# Patient Record
Sex: FEMALE | Race: WHITE | Employment: OTHER | URBAN - NONMETROPOLITAN AREA
[De-identification: names, ages, dates, MRNs, and addresses within clinical notes are randomized per-mention and may not be internally consistent; named-entity substitution may affect disease eponyms.]

---

## 2017-08-12 ENCOUNTER — HOSPITAL ENCOUNTER (OUTPATIENT)
Age: 67
Discharge: HOME | End: 2017-08-12
Payer: MEDICARE

## 2017-08-12 ENCOUNTER — HOSPITAL ENCOUNTER (OUTPATIENT)
Dept: INTERVENTIONAL RADIOLOGY/VASCULAR | Age: 67
Discharge: HOME | End: 2017-08-12
Payer: MEDICARE

## 2017-08-12 ENCOUNTER — HOSPITAL ENCOUNTER (EMERGENCY)
Age: 67
Discharge: HOME | End: 2017-08-12
Attending: FAMILY MEDICINE
Payer: MEDICARE

## 2017-08-12 VITALS
RESPIRATION RATE: 16 BRPM | SYSTOLIC BLOOD PRESSURE: 164 MMHG | HEIGHT: 66 IN | BODY MASS INDEX: 31.34 KG/M2 | HEART RATE: 90 BPM | TEMPERATURE: 98.9 F | OXYGEN SATURATION: 96 % | DIASTOLIC BLOOD PRESSURE: 73 MMHG | WEIGHT: 195 LBS

## 2017-08-12 DIAGNOSIS — M79.605 LEFT LEG PAIN: Primary | ICD-10-CM

## 2017-08-12 DIAGNOSIS — M79.662 PAIN AND SWELLING OF LEFT LOWER LEG: ICD-10-CM

## 2017-08-12 DIAGNOSIS — M79.89 PAIN AND SWELLING OF LEFT LOWER LEG: ICD-10-CM

## 2017-08-12 PROCEDURE — 93971 EXTREMITY STUDY: CPT

## 2017-08-12 PROCEDURE — 99283 EMERGENCY DEPT VISIT LOW MDM: CPT

## 2017-08-12 RX ORDER — ASPIRIN 81 MG/1
81 TABLET, CHEWABLE ORAL DAILY
COMMUNITY
End: 2021-08-19 | Stop reason: ALTCHOICE

## 2017-08-12 RX ORDER — ATORVASTATIN CALCIUM 10 MG/1
10 TABLET, FILM COATED ORAL DAILY
COMMUNITY
End: 2019-05-16 | Stop reason: ALTCHOICE

## 2017-08-12 ASSESSMENT — ENCOUNTER SYMPTOMS
EYE PAIN: 0
BACK PAIN: 0
RHINORRHEA: 0
NAUSEA: 0
SORE THROAT: 0
VOMITING: 0
ABDOMINAL PAIN: 0
DIARRHEA: 0
EYE DISCHARGE: 0
WHEEZING: 0
COUGH: 0
SHORTNESS OF BREATH: 0

## 2017-08-12 ASSESSMENT — PAIN DESCRIPTION - LOCATION: LOCATION: LEG

## 2017-08-12 ASSESSMENT — PAIN DESCRIPTION - PAIN TYPE: TYPE: ACUTE PAIN

## 2017-08-12 ASSESSMENT — PAIN SCALES - GENERAL: PAINLEVEL_OUTOF10: 5

## 2017-08-12 ASSESSMENT — PAIN DESCRIPTION - ORIENTATION: ORIENTATION: LEFT;POSTERIOR

## 2017-08-12 ASSESSMENT — PAIN DESCRIPTION - DESCRIPTORS: DESCRIPTORS: BURNING;OTHER (COMMENT)

## 2017-12-14 ENCOUNTER — HOSPITAL ENCOUNTER (OUTPATIENT)
Dept: MAMMOGRAPHY | Age: 67
Discharge: HOME | End: 2017-12-14
Payer: MEDICARE

## 2017-12-14 DIAGNOSIS — Z12.39 SCREENING BREAST EXAMINATION: ICD-10-CM

## 2017-12-14 PROCEDURE — 77063 BREAST TOMOSYNTHESIS BI: CPT

## 2018-08-28 ENCOUNTER — HOSPITAL ENCOUNTER (OUTPATIENT)
Dept: MAMMOGRAPHY | Age: 68
Discharge: HOME | End: 2018-08-28
Payer: MEDICARE

## 2018-08-28 DIAGNOSIS — Z12.39 SCREENING BREAST EXAMINATION: ICD-10-CM

## 2018-08-28 PROCEDURE — 77063 BREAST TOMOSYNTHESIS BI: CPT

## 2019-03-25 ENCOUNTER — TELEPHONE (OUTPATIENT)
Dept: FAMILY MEDICINE CLINIC | Age: 69
End: 2019-03-25

## 2019-05-16 ENCOUNTER — OFFICE VISIT (OUTPATIENT)
Dept: FAMILY MEDICINE CLINIC | Age: 69
End: 2019-05-16
Payer: MEDICARE

## 2019-05-16 VITALS
DIASTOLIC BLOOD PRESSURE: 72 MMHG | RESPIRATION RATE: 16 BRPM | HEIGHT: 66 IN | WEIGHT: 191 LBS | HEART RATE: 72 BPM | BODY MASS INDEX: 30.7 KG/M2 | SYSTOLIC BLOOD PRESSURE: 136 MMHG | OXYGEN SATURATION: 96 %

## 2019-05-16 DIAGNOSIS — Z78.0 ASYMPTOMATIC MENOPAUSAL STATE: ICD-10-CM

## 2019-05-16 DIAGNOSIS — Z72.89 OTHER PROBLEMS RELATED TO LIFESTYLE: ICD-10-CM

## 2019-05-16 DIAGNOSIS — Z00.00 ROUTINE GENERAL MEDICAL EXAMINATION AT A HEALTH CARE FACILITY: Primary | ICD-10-CM

## 2019-05-16 DIAGNOSIS — Z13.6 SCREENING FOR CARDIOVASCULAR CONDITION: ICD-10-CM

## 2019-05-16 DIAGNOSIS — E11.9 TYPE 2 DIABETES MELLITUS WITHOUT COMPLICATION, WITHOUT LONG-TERM CURRENT USE OF INSULIN (HCC): ICD-10-CM

## 2019-05-16 DIAGNOSIS — Z13.220 SCREENING FOR HYPERLIPIDEMIA: ICD-10-CM

## 2019-05-16 DIAGNOSIS — Z11.59 ENCOUNTER FOR HEPATITIS C SCREENING TEST FOR LOW RISK PATIENT: ICD-10-CM

## 2019-05-16 DIAGNOSIS — E03.9 ACQUIRED HYPOTHYROIDISM: ICD-10-CM

## 2019-05-16 PROCEDURE — G0472 HEP C SCREEN HIGH RISK/OTHER: HCPCS | Performed by: FAMILY MEDICINE

## 2019-05-16 PROCEDURE — G0446 INTENS BEHAVE THER CARDIO DX: HCPCS | Performed by: FAMILY MEDICINE

## 2019-05-16 PROCEDURE — G0439 PPPS, SUBSEQ VISIT: HCPCS | Performed by: FAMILY MEDICINE

## 2019-05-16 RX ORDER — LEVOTHYROXINE AND LIOTHYRONINE 57; 13.5 UG/1; UG/1
90 TABLET ORAL DAILY
COMMUNITY
End: 2019-11-13 | Stop reason: SDUPTHER

## 2019-05-16 RX ORDER — METFORMIN HYDROCHLORIDE 500 MG/1
500 TABLET, EXTENDED RELEASE ORAL 2 TIMES DAILY
COMMUNITY
Start: 2019-04-01 | End: 2019-11-13 | Stop reason: SDUPTHER

## 2019-05-16 RX ORDER — LEVOTHYROXINE AND LIOTHYRONINE 38; 9 UG/1; UG/1
60 TABLET ORAL DAILY
COMMUNITY
End: 2019-11-13 | Stop reason: SDUPTHER

## 2019-05-16 RX ORDER — ATORVASTATIN CALCIUM 20 MG/1
TABLET, FILM COATED ORAL
COMMUNITY
Start: 2019-04-01 | End: 2019-11-13 | Stop reason: SDUPTHER

## 2019-05-16 ASSESSMENT — LIFESTYLE VARIABLES
HOW OFTEN DURING THE LAST YEAR HAVE YOU FOUND THAT YOU WERE NOT ABLE TO STOP DRINKING ONCE YOU HAD STARTED: 0
HOW OFTEN DO YOU HAVE SIX OR MORE DRINKS ON ONE OCCASION: 0
HOW OFTEN DURING THE LAST YEAR HAVE YOU HAD A FEELING OF GUILT OR REMORSE AFTER DRINKING: 0
AUDIT-C TOTAL SCORE: 1
HAVE YOU OR SOMEONE ELSE BEEN INJURED AS A RESULT OF YOUR DRINKING: 0
HAS A RELATIVE, FRIEND, DOCTOR, OR ANOTHER HEALTH PROFESSIONAL EXPRESSED CONCERN ABOUT YOUR DRINKING OR SUGGESTED YOU CUT DOWN: 0
HOW OFTEN DO YOU HAVE A DRINK CONTAINING ALCOHOL: 1
AUDIT TOTAL SCORE: 1
HOW MANY STANDARD DRINKS CONTAINING ALCOHOL DO YOU HAVE ON A TYPICAL DAY: 0
HOW OFTEN DURING THE LAST YEAR HAVE YOU BEEN UNABLE TO REMEMBER WHAT HAPPENED THE NIGHT BEFORE BECAUSE YOU HAD BEEN DRINKING: 0
HOW OFTEN DURING THE LAST YEAR HAVE YOU FAILED TO DO WHAT WAS NORMALLY EXPECTED FROM YOU BECAUSE OF DRINKING: 0
HOW OFTEN DURING THE LAST YEAR HAVE YOU NEEDED AN ALCOHOLIC DRINK FIRST THING IN THE MORNING TO GET YOURSELF GOING AFTER A NIGHT OF HEAVY DRINKING: 0

## 2019-05-16 ASSESSMENT — PATIENT HEALTH QUESTIONNAIRE - PHQ9
SUM OF ALL RESPONSES TO PHQ QUESTIONS 1-9: 0
SUM OF ALL RESPONSES TO PHQ QUESTIONS 1-9: 0

## 2019-05-16 NOTE — PROGRESS NOTES
Medicare Annual Wellness Visit  Name: Alli Resendez Date: 2019   MRN: [id number] Sex: Female   Age: 80 y.o. Ethnicity: Non-/Non    : 1950 Race: Damian Palacio is here for Medicare AWV; Other (Patient is due for colonoscopy in 2019.); and Other (Patient has not had a dexa scan recently but did have a heel scan.)    Screenings for behavioral, psychosocial and functional/safety risks, and cognitive dysfunction are all negative except as indicated below. These results, as well as other patient data from the [employer] form, are documented in Flowsheets linked to this Encounter. No Known Allergies    Prior to Visit Medications    Medication Sig Taking? Authorizing Provider   atorvastatin (LIPITOR) 20 MG tablet  Yes Historical Provider, MD   metFORMIN (GLUCOPHAGE-XR) 500 MG extended release tablet Take 500 mg by mouth 2 times daily  Yes Historical Provider, MD   thyroid (NP THYROID) 90 MG tablet Take 90 mg by mouth daily , Tuesday, Thursday and Saturday. Patient take Natures Pure Thyroid-not [place] Thyroid Yes Historical Provider, MD   thyroid (NP THYROID) 60 MG tablet Take 60 mg by mouth daily Monday, Wed and Friday. Natures Pure Thyroid-not [place] Yes Historical Provider, MD   aspirin 81 MG chewable tablet Take 81 mg by mouth daily Yes Historical Provider, MD   clobetasol (TEMOVATE) 0.05 % cream Apply  topically daily. Yes Historical Provider, MD   ibuprofen (ADVIL;MOTRIN) 600 MG tablet Take 600 mg by mouth every 6 hours as needed.    Yes Historical Provider, MD       Past Medical History:   Diagnosis Date    Hypothyroidism     Insulin resistance     Pancreatitis     r/t Gall bladder     Past Surgical History:   Procedure Laterality Date     SECTION      CHOLECYSTECTOMY      HYSTERECTOMY      TONSILLECTOMY         Family History   Problem Relation Age of Onset    Diabetes Mother     Heart Disease Mother     Cancer Mother    Dayana within the past year?: Yes  Body mass index is 30.83 kg/m². Health Habits/Nutrition Interventions:  · Inadequate physical activity:  encouraged to continue exercising even now that she is back in [facility], was exercising regularly in [facility] Maintenance Status  Immunization History   Administered Date(s) Administered    Pneumococcal 13-valent Conjugate ([id number]) 01/01/2016    Pneumococcal Polysaccharide (Weiszooau50) 12/04/2014    Tdap (Boostrix, Adacel) 10/23/2018        Health Maintenance   Topic Date Due    Hepatitis C screen  1950    Diabetic foot exam  09/19/1961    Diabetic retinal exam  09/19/1961    Shingles Vaccine (1 of 2) 09/19/2001    Colon cancer screen colonoscopy  09/19/2001    Diabetic microalbuminuria test  12/11/2013    Lipid screen  12/11/2013    A1C test (Diabetic or Prediabetic)  12/05/2014    DEXA (modify frequency per FRAX score)  09/19/2016    Flu vaccine (Season Ended) 09/01/2019    Pneumococcal 65+ years Vaccine (2 of 2 - PPSV23) 12/04/2019    Breast cancer screen  08/28/2020    DTaP/Tdap/Td vaccine (2 - Td) 10/23/2028     Recommendations for Preventive Services Due: see orders and patient instructions/AVS.  . Recommended screening schedule for the next 5-10 years is provided to the patient in written form: see Patient Instructions/AVS.        Cardiovascular Disease Risk Counseling: Assessed the patient's risk to develop cardiovascular disease and reviewed main risk factors.    Reviewed steps to reduce disease risk including:   · Quitting tobacco use, reducing amount smoked, or not starting the habit  · Making healthy food choices  · Being physically active and gradualy increasing activity levels   · Reduce weight and determine a healthy BMI goal  · Monitor blood pressure and treat if higher than 140/90 mmHg  · Maintain blood total cholesterol levels under 5 mmol/l or 190 mg/dl  · Maintain LDL cholesterol levels under 3.0 mmol/l or 115 mg/dl   · Control blood glucose levels  · Consider taking aspirin (75 mg daily), once blood pressure is controlled   Provided a follow up plan.   Time spent (minutes): 5 min

## 2019-05-16 NOTE — PATIENT INSTRUCTIONS
Advance Directives: Care Instructions  Your Care Instructions  An advance directive is a legal way to state your wishes at the end of your life. It tells your family and your doctor what to do if you can no longer say what you want. There are two main types of advance directives. You can change them any time that your wishes change. · A living will tells your family and your doctor your wishes about life support and other treatment. · A durable power of  for health care lets you name a person to make treatment decisions for you when you can't speak for yourself. This person is called a health care agent. If you do not have an advance directive, decisions about your medical care may be made by a doctor or a  who doesn't know you. It may help to think of an advance directive as a gift to the people who care for you. If you have one, they won't have to make tough decisions by themselves. Follow-up care is a key part of your treatment and safety. Be sure to make and go to all appointments, and call your doctor if you are having problems. It's also a good idea to know your test results and keep a list of the medicines you take. How can you care for yourself at home? · Discuss your wishes with your loved ones and your doctor. This way, there are no surprises. · Many states have a unique form. Or you might use a universal form that has been approved by many states. This kind of form can sometimes be completed and stored online. Your electronic copy will then be available wherever you have a connection to the Internet. In most cases, doctors will respect your wishes even if you have a form from a different state. · You don't need a  to do an advance directive. But you may want to get legal advice. · Think about these questions when you prepare an advance directive:  ? Who do you want to make decisions about your medical care if you are not able to?  Many people choose a family member or close friend. ? Do you know enough about life support methods that might be used? If not, talk to your doctor so you understand. ? What are you most afraid of that might happen? You might be afraid of having pain, losing your independence, or being kept alive by machines. ? Where would you prefer to die? Choices include your home, a hospital, or a nursing home. ? Would you like to have information about hospice care to support you and your family? ? Do you want to donate organs when you die? ? Do you want certain Syn[employer] practices performed before you die? If so, put your wishes in the advance directive. · Read your advance directive every year, and make changes as needed. When should you call for help? Be sure to contact your doctor if you have any questions. Where can you learn more? Go to [email or web address].[email or web address] org and sign in to your [employer] account. Enter R264 in the [employer] box to learn more about \"Advance Directives: Care Instructions. \"     If you do not have an account, please click on the \"Sign Up Now\" link. Current as of: April 18, 2018  Content Version: 12.0  © 6896-2332 Healthwise, Incorporated. Care instructions adapted under license by [facility] ([facility]). If you have questions about a medical condition or this instruction, always ask your healthcare professional. [address] any warranty or liability for your use of this information. Learning About Healthy Weight  What is a healthy weight? A healthy weight is the weight at which you feel good about yourself and have energy for work and play. It's also one that lowers your risk for health problems. What can you do to stay at a healthy weight? It can be hard to stay at a healthy weight, especially when fast food, vending-machine snacks, and processed foods are so easy to find. And with your busy lifestyle, activity may be low on your list of things to do.  But staying at a healthy weight may be easier than you think. Here are some dos and don'ts for staying at a healthy weight:  Do eat healthy foods  The kinds of foods you eat have a big impact on both your weight and your health. Reaching and staying at a healthy weight is not about going on a diet. It's about making healthier food choices every day and changing your diet for good. Healthy eating means eating a variety of foods so that you get all the nutrients you need. Your body needs protein, carbohydrate, and fats for energy. They keep your heart beating, your brain active, and your muscles working. On most days, try to eat from each food group. This means eating a variety of:  · Whole grains, such as whole wheat breads and pastas. · Fruits and vegetables. · Dairy products, such as low-fat milk, yogurt, and cheese. · Lean proteins, such as all types of fish, chicken without the skin, and beans. Don't have too much or too little of one thing. All foods, if eaten in moderation, can be part of healthy eating. Even sweets can be okay. If your favorite foods are high in fat, salt, sugar, or calories, limit how often you eat them. Eat smaller servings, or look for healthy substitutes. Do watch what you eat  Many people eat more than their bodies need. Part of staying at a healthy weight means learning how much food you really need from day to day and not eating more than that. Even with healthy foods, eating too much can make you gain weight. Having a well-balanced diet means that you eat enough, but not too much, and that your food gives you the nutrients you need to stay healthy. So listen to your body. Eat when you're hungry. Stop when you feel satisfied. It's a good idea to have healthy snacks ready for when you get hungry. Keep healthy snacks with you at work, in your car, and at home.  If you have a healthy snack easily available, you'll be less likely to pick a candy bar or bag of chips from a vending machine instead. Some healthy snacks you might want to keep on hand are fruit, low-fat yogurt, string cheese, low-fat microwave popcorn, raisins and other dried fruit, nuts, whole wheat crackers, pretzels, carrots, celery sticks, and broccoli. Do some physical activity  A big part of reaching and staying at a healthy weight is being active. When you're active, you burn calories. This makes it easier to reach and stay at a healthy weight. When you're active on a regular basis, your body burns more calories, even when you're at rest. Being active helps you lose fat and build lean muscle. Try to be active for at least 1 hour every day. This may sound like a lot, but it's okay to be active in smaller blocks of time that add up to 1 hour a day. Any activity that makes your heart beat faster and keeps it there for a while counts. A brisk walk, run, or swim will get your heart beating faster. So will climbing stairs, shooting baskets, or cycling. Even some household chores like vacuuming and mowing the lawn will get your heart rate up. Pick activities that you enjoy--ones that make your heart beat faster, your muscles stronger, and your muscles and joints more flexible. If you find more than one thing you like doing, do them all. You don't have to do the same thing every day. Don't diet  Diets don't work. Diets are temporary. Because you give up so much when you diet, you may be hungry and think about food all the time. And after you stop dieting, you also may overeat to make up for what you missed. Most people who diet end up gaining back the pounds they lost--and more. Remember that healthy bodies come in lots of shapes and sizes. Everyone can get healthier by eating better and being more active. Where can you learn more? Go to https://zach.[employer]. org and sign in to your [employer] account. Enter 407 [id number] in the payever box to learn more about \"Learning About Healthy Weight. \"     If you do not have an account, please click on the \"Sign Up Now\" link. Current as of: June 25, 2018  Content Version: 12.0  © 2268-7973 Healthwise, "[employer]. Care instructions adapted under license by [facility] ([facility]). If you have questions about a medical condition or this instruction, always ask your healthcare professional. [address] any warranty or liability for your use of this information. Personalized Preventive Plan for Alexey Guzman - 5/16/2019  Medicare offers a range of preventive health benefits. Some of the tests and screenings are paid in full while other may be subject to a deductible, co-insurance, and/or copay. Some of these benefits include a comprehensive review of your medical history including lifestyle, illnesses that may run in your family, and various assessments and screenings as appropriate. After reviewing your medical record and screening and assessments performed today your provider may have ordered immunizations, labs, imaging, and/or referrals for you. A list of these orders (if applicable) as well as your Preventive Care list are included within your After Visit Summary for your review. Other Preventive Recommendations:    · A preventive eye exam performed by an eye specialist is recommended every 1-2 years to screen for glaucoma; cataracts, macular degeneration, and other eye disorders. · A preventive dental visit is recommended every 6 months. · Try to get at least 150 minutes of exercise per week or 10,000 steps per day on a pedometer . · Order or download the FREE \"Exercise & Physical Activity: Your Everyday Guide\" from [employer]. Call 7-554.614.4873 or search [employer] online. · You need 9505-4227 mg of calcium and 4548-9420 IU of vitamin D per day.  It is possible to meet your calcium requirement with diet alone, but a vitamin D supplement is usually necessary to meet this goal.  · When exposed to

## 2019-05-18 ENCOUNTER — HOSPITAL ENCOUNTER (OUTPATIENT)
Age: 69
Discharge: HOME | End: 2019-05-18
Payer: MEDICARE

## 2019-05-18 DIAGNOSIS — Z13.220 SCREENING FOR HYPERLIPIDEMIA: ICD-10-CM

## 2019-05-18 DIAGNOSIS — E03.9 ACQUIRED HYPOTHYROIDISM: ICD-10-CM

## 2019-05-18 DIAGNOSIS — Z11.59 ENCOUNTER FOR HEPATITIS C SCREENING TEST FOR LOW RISK PATIENT: ICD-10-CM

## 2019-05-18 DIAGNOSIS — E11.9 TYPE 2 DIABETES MELLITUS WITHOUT COMPLICATION, WITHOUT LONG-TERM CURRENT USE OF INSULIN (HCC): ICD-10-CM

## 2019-05-18 LAB
ALBUMIN SERPL-MCNC: 3.8 G/DL (ref 3.5–5.1)
ALP BLD-CCNC: 69 U/L (ref 38–126)
ALT SERPL-CCNC: 25 U/L (ref 11–66)
ANION GAP SERPL CALCULATED.3IONS-SCNC: 14 MEQ/L (ref 8–16)
AST SERPL-CCNC: 23 U/L (ref 5–40)
AVERAGE GLUCOSE: 105 MG/DL (ref 70–126)
BASOPHILS # BLD: 1.1 %
BASOPHILS ABSOLUTE: 0 THOU/MM3 (ref 0–0.1)
BILIRUB SERPL-MCNC: 0.9 MG/DL (ref 0.3–1.2)
BUN BLDV-MCNC: 12 MG/DL (ref 7–22)
CALCIUM SERPL-MCNC: 9.2 MG/DL (ref 8.5–10.5)
CHLORIDE BLD-SCNC: 107 MEQ/L (ref 98–111)
CHOLESTEROL, FASTING: 105 MG/DL (ref 100–199)
CO2: 24 MEQ/L (ref 23–33)
CREAT SERPL-MCNC: 0.8 MG/DL (ref 0.4–1.2)
EOSINOPHIL # BLD: 3.9 %
EOSINOPHILS ABSOLUTE: 0.2 THOU/MM3 (ref 0–0.4)
ERYTHROCYTE [DISTWIDTH] IN BLOOD BY AUTOMATED COUNT: 13.3 % (ref 11.5–14.5)
ERYTHROCYTE [DISTWIDTH] IN BLOOD BY AUTOMATED COUNT: 43.8 FL (ref 35–45)
GFR SERPL CREATININE-BSD FRML MDRD: 71 ML/MIN/1.73M2
GLUCOSE BLD-MCNC: 109 MG/DL (ref 70–108)
HBA1C MFR BLD: 5.5 % (ref 4.4–6.4)
HCT VFR BLD CALC: 40.2 % (ref 37–47)
HDLC SERPL-MCNC: 41 MG/DL
HEMOGLOBIN: 13.5 GM/DL (ref 12–16)
HEPATITIS C ANTIBODY: NEGATIVE
IMMATURE GRANS (ABS): 0.01 THOU/MM3 (ref 0–0.07)
IMMATURE GRANULOCYTES: 0.2 %
LDL CHOLESTEROL CALCULATED: 47 MG/DL
LYMPHOCYTES # BLD: 33.1 %
LYMPHOCYTES ABSOLUTE: 1.5 THOU/MM3 (ref 1–4.8)
MCH RBC QN AUTO: 30.8 PG (ref 26–33)
MCHC RBC AUTO-ENTMCNC: 33.6 GM/DL (ref 32.2–35.5)
MCV RBC AUTO: 91.8 FL (ref 81–99)
MONOCYTES # BLD: 9.1 %
MONOCYTES ABSOLUTE: 0.4 THOU/MM3 (ref 0.4–1.3)
NUCLEATED RED BLOOD CELLS: 0 /100 WBC
PLATELET # BLD: 187 THOU/MM3 (ref 130–400)
PMV BLD AUTO: 11.4 FL (ref 9.4–12.4)
POTASSIUM SERPL-SCNC: 3.8 MEQ/L (ref 3.5–5.2)
RBC # BLD: 4.38 MILL/MM3 (ref 4.2–5.4)
SEG NEUTROPHILS: 52.6 %
SEGMENTED NEUTROPHILS ABSOLUTE COUNT: 2.3 THOU/MM3 (ref 1.8–7.7)
SODIUM BLD-SCNC: 145 MEQ/L (ref 135–145)
TOTAL PROTEIN: 6.6 G/DL (ref 6.1–8)
TRIGLYCERIDE, FASTING: 85 MG/DL (ref 0–199)
TSH SERPL DL<=0.05 MIU/L-ACNC: 2.07 UIU/ML (ref 0.4–4.2)
WBC # BLD: 4.4 THOU/MM3 (ref 4.8–10.8)

## 2019-05-18 PROCEDURE — 83036 HEMOGLOBIN GLYCOSYLATED A1C: CPT

## 2019-05-18 PROCEDURE — 36415 COLL VENOUS BLD VENIPUNCTURE: CPT

## 2019-05-18 PROCEDURE — 85025 COMPLETE CBC W/AUTO DIFF WBC: CPT

## 2019-05-18 PROCEDURE — 80061 LIPID PANEL: CPT

## 2019-05-18 PROCEDURE — 86803 HEPATITIS C AB TEST: CPT

## 2019-05-18 PROCEDURE — 80053 COMPREHEN METABOLIC PANEL: CPT

## 2019-05-18 PROCEDURE — 84443 ASSAY THYROID STIM HORMONE: CPT

## 2019-06-18 ENCOUNTER — HOSPITAL ENCOUNTER (OUTPATIENT)
Dept: MRI IMAGING | Age: 69
Discharge: HOME | End: 2019-06-18
Payer: MEDICARE

## 2019-06-18 DIAGNOSIS — M54.5 LOW BACK PAIN, UNSPECIFIED BACK PAIN LATERALITY, UNSPECIFIED CHRONICITY, WITH SCIATICA PRESENCE UNSPECIFIED: ICD-10-CM

## 2019-06-18 PROCEDURE — 72148 MRI LUMBAR SPINE W/O DYE: CPT

## 2019-08-29 ENCOUNTER — HOSPITAL ENCOUNTER (OUTPATIENT)
Dept: MAMMOGRAPHY | Age: 69
Discharge: HOME | End: 2019-08-29
Payer: MEDICARE

## 2019-08-29 DIAGNOSIS — Z12.39 SCREENING BREAST EXAMINATION: ICD-10-CM

## 2019-08-29 PROCEDURE — 77063 BREAST TOMOSYNTHESIS BI: CPT

## 2019-09-03 ENCOUNTER — APPOINTMENT (OUTPATIENT)
Dept: WOMENS IMAGING | Age: 69
End: 2019-09-03
Payer: MEDICARE

## 2019-09-03 ENCOUNTER — HOSPITAL ENCOUNTER (OUTPATIENT)
Dept: WOMENS IMAGING | Age: 69
Discharge: HOME | End: 2019-09-03
Payer: MEDICARE

## 2019-09-03 DIAGNOSIS — R92.2 BREAST DENSITY: ICD-10-CM

## 2019-09-03 PROCEDURE — G0279 TOMOSYNTHESIS, MAMMO: HCPCS

## 2019-11-13 RX ORDER — ATORVASTATIN CALCIUM 20 MG/1
20 TABLET, FILM COATED ORAL DAILY
Qty: 90 TABLET | Refills: 3 | Status: SHIPPED | OUTPATIENT
Start: 2019-11-13 | End: 2020-09-08 | Stop reason: SDUPTHER

## 2019-11-13 RX ORDER — IBUPROFEN 600 MG/1
600 TABLET ORAL EVERY 6 HOURS PRN
Qty: 360 TABLET | Refills: 3 | Status: SHIPPED | OUTPATIENT
Start: 2019-11-13 | End: 2020-01-14 | Stop reason: SDUPTHER

## 2019-11-13 RX ORDER — LEVOTHYROXINE AND LIOTHYRONINE 57; 13.5 UG/1; UG/1
90 TABLET ORAL DAILY
Qty: 90 TABLET | Refills: 3 | Status: SHIPPED | OUTPATIENT
Start: 2019-11-13 | End: 2020-09-08 | Stop reason: SDUPTHER

## 2019-11-13 RX ORDER — LEVOTHYROXINE AND LIOTHYRONINE 57; 13.5 UG/1; UG/1
90 TABLET ORAL DAILY
Qty: 90 TABLET | Refills: 1 | Status: SHIPPED | OUTPATIENT
Start: 2019-11-13 | End: 2019-11-13 | Stop reason: SDUPTHER

## 2019-11-13 RX ORDER — METFORMIN HYDROCHLORIDE 500 MG/1
500 TABLET, EXTENDED RELEASE ORAL 2 TIMES DAILY
Qty: 180 TABLET | Refills: 3 | Status: SHIPPED | OUTPATIENT
Start: 2019-11-13 | End: 2020-09-08 | Stop reason: SDUPTHER

## 2019-11-13 RX ORDER — LEVOTHYROXINE AND LIOTHYRONINE 38; 9 UG/1; UG/1
60 TABLET ORAL DAILY
Qty: 90 TABLET | Refills: 1 | Status: SHIPPED | OUTPATIENT
Start: 2019-11-13 | End: 2019-11-13 | Stop reason: SDUPTHER

## 2019-11-13 RX ORDER — LEVOTHYROXINE AND LIOTHYRONINE 38; 9 UG/1; UG/1
60 TABLET ORAL DAILY
Qty: 90 TABLET | Refills: 3 | Status: SHIPPED | OUTPATIENT
Start: 2019-11-13 | End: 2020-09-08 | Stop reason: SDUPTHER

## 2019-11-13 RX ORDER — IBUPROFEN 600 MG/1
600 TABLET ORAL EVERY 6 HOURS PRN
Qty: 120 TABLET | Refills: 3 | Status: SHIPPED | OUTPATIENT
Start: 2019-11-13 | End: 2019-11-13 | Stop reason: SDUPTHER

## 2019-11-13 RX ORDER — METFORMIN HYDROCHLORIDE 500 MG/1
500 TABLET, EXTENDED RELEASE ORAL 2 TIMES DAILY
Qty: 180 TABLET | Refills: 3 | Status: SHIPPED | OUTPATIENT
Start: 2019-11-13 | End: 2019-11-13 | Stop reason: SDUPTHER

## 2019-11-13 RX ORDER — ATORVASTATIN CALCIUM 20 MG/1
20 TABLET, FILM COATED ORAL DAILY
Qty: 90 TABLET | Refills: 3 | Status: SHIPPED | OUTPATIENT
Start: 2019-11-13 | End: 2019-11-13 | Stop reason: SDUPTHER

## 2020-01-14 RX ORDER — IBUPROFEN 600 MG/1
600 TABLET ORAL EVERY 6 HOURS PRN
Qty: 360 TABLET | Refills: 3 | Status: SHIPPED | OUTPATIENT
Start: 2020-01-14 | End: 2020-09-08 | Stop reason: SDUPTHER

## 2020-01-14 NOTE — TELEPHONE ENCOUNTER
Pt called stating that she is in [facility] sent her a script for 30 day instead of 90 day. Pt doesn't have the script for the 90 day supply [place] it in [place].

## 2020-03-23 ENCOUNTER — TELEMEDICINE (OUTPATIENT)
Dept: FAMILY MEDICINE CLINIC | Age: 70
End: 2020-03-23
Payer: MEDICARE

## 2020-03-23 PROCEDURE — 99213 OFFICE O/P EST LOW 20 MIN: CPT | Performed by: FAMILY MEDICINE

## 2020-03-23 RX ORDER — AMOXICILLIN 875 MG/1
875 TABLET, COATED ORAL 2 TIMES DAILY
Qty: 20 TABLET | Refills: 0 | Status: SHIPPED | OUTPATIENT
Start: 2020-03-23 | End: 2020-04-02

## 2020-03-23 ASSESSMENT — ENCOUNTER SYMPTOMS
SORE THROAT: 0
NAUSEA: 0
WHEEZING: 0
SHORTNESS OF BREATH: 0
SINUS PRESSURE: 1
DIARRHEA: 0
EYE DISCHARGE: 0
ABDOMINAL PAIN: 0
COUGH: 0
RHINORRHEA: 0
SINUS PAIN: 1
CONSTIPATION: 0

## 2020-03-23 NOTE — PROGRESS NOTES
Iman Becker MD   metFORMIN (GLUCOPHAGE-XR) 500 MG extended release tablet Take 1 tablet by mouth 2 times daily  Iman Becker MD   atorvastatin (LIPITOR) 20 MG tablet Take 1 tablet by mouth daily  Iman Becker MD   aspirin 81 MG chewable tablet Take 81 mg by mouth daily  Historical Provider, MD   clobetasol (TEMOVATE) 0.05 % cream Apply  topically daily.     Historical Provider, MD       Social History     Tobacco Use    Smoking status: Never Smoker    Smokeless tobacco: Former User   Substance Use Topics    Alcohol use: No    Drug use: Not on file        No Known Allergies,   Past Medical History:   Diagnosis Date    Hypothyroidism     Insulin resistance     Pancreatitis     r/t Gall bladder   ,   Past Surgical History:   Procedure Laterality Date     SECTION      CHOLECYSTECTOMY      HYSTERECTOMY      TONSILLECTOMY     ,   Social History     Tobacco Use    Smoking status: Never Smoker    Smokeless tobacco: Former User   Substance Use Topics    Alcohol use: No    Drug use: Not on file   ,   Family History   Problem Relation Age of Onset    Diabetes Mother     Heart Disease Mother     Cancer Mother     Heart Disease Father     Cancer Father         bone    Heart Disease Paternal Uncle     Heart Disease Paternal Grandmother     Heart Disease Paternal Grandfather    ,   Immunization History   Administered Date(s) Administered    Influenza, High Dose (Fluzone 65 yrs and older) 10/07/2017, 10/23/2018    Influenza, Quadv, IM, PF (6 mo and older Fluzone, Flulaval, Fluarix, and 3 yrs and older Afluria) 10/26/2015, 10/08/2019    Pneumococcal Conjugate 13-valent ([id number]) 2016    Pneumococcal Polysaccharide (Gomcvqkwb17) 2014    Tdap (Boostrix, Adacel) 2008, 10/23/2018    Zoster Recombinant (Shingrix) 2019, 2019   ,   Health Maintenance   Topic Date Due    Diabetic foot exam  1961    Diabetic retinal exam  1961    Hepatitis B vaccine (1 of 3 - Risk 3-dose series) 09/19/1970    Diabetic microalbuminuria test  12/11/2013    DEXA (modify frequency per FRAX score)  09/19/2016    Pneumococcal 65+ years Vaccine (2 of 2 - PPSV23) 12/04/2019    Annual Wellness Visit (AWV)  05/16/2020    A1C test (Diabetic or Prediabetic)  05/18/2020    Lipid screen  05/18/2020    Breast cancer screen  09/03/2021    DTaP/Tdap/Td vaccine (3 - Td) 10/23/2028    Colon cancer screen colonoscopy  09/10/2029    Flu vaccine  Completed    Shingles Vaccine  Completed    Hepatitis C screen  Completed    Hepatitis A vaccine  Aged Out    Hib vaccine  Aged Out    Meningococcal (ACWY) vaccine  Aged Out       PHYSICAL EXAMINATION:  [ INSTRUCTIONS:  \"[x]\" Indicates a positive item  \"[]\" Indicates a negative item  -- DELETE ALL ITEMS NOT EXAMINED]  [x] Alert  [] Oriented to person/place/time    [x] No apparent distress  [] Toxic appearing    [] Face flushed appearing [] Sclera clear  [] Lips are cyanotic      [x] Breathing appears normal  [] Appears tachypneic      [] Rash on visible skin    [x] Cranial Nerves II-XII grossly intact    [] Motor grossly intact in visible upper extremities    [] Motor grossly intact in visible lower extremities    [x] Normal Mood  [] Anxious appearing    [] Depressed appearing  [] Confused appearing      [] Poor short term memory  [] Poor long term memory    [] OTHER:      Due to this being a TeleHealth encounter, evaluation of the following organ systems is limited: Vitals/Constitutional/EENT/Resp/CV/GI//MS/Neuro/Skin/Heme-Lymph-Imm. ASSESSMENT/PLAN:  1. Acute sinusitis, recurrence not specified, unspecified location    - amoxicillin (AMOXIL) 875 MG tablet; Take 1 tablet by mouth 2 times daily for 10 days  Dispense: 20 tablet; Refill: 0      No follow-ups on file. An  electronic signature was used to authenticate this note.     --Kia Schultz MD on 3/23/2020 at 4:54 PM      I spent greater than 15 minutes face-to-face virtual visit with patient reviewing past medical history, reviewing lab work, educating, explaining importance of medication adherence and health maintenance recommendations. Of that 15 minutes I spent 12 minutes on counseling and/or coordination of care. Pursuant to the emergency declaration under the [facility] waiver authority and the [employer] and [employer] Act, this Virtual  Visit was conducted, with patient's consent, to reduce the patient's risk of exposure to COVID-19 and provide continuity of care for an established patient. Services were provided through a video synchronous discussion virtually to substitute for in-person clinic visit.

## 2020-04-01 ENCOUNTER — TELEMEDICINE (OUTPATIENT)
Dept: FAMILY MEDICINE CLINIC | Age: 70
End: 2020-04-01
Payer: MEDICARE

## 2020-04-01 PROCEDURE — 99213 OFFICE O/P EST LOW 20 MIN: CPT | Performed by: FAMILY MEDICINE

## 2020-04-01 ASSESSMENT — ENCOUNTER SYMPTOMS
WHEEZING: 0
ABDOMINAL PAIN: 0
SINUS PAIN: 1
SINUS PRESSURE: 1
SORE THROAT: 0
DIARRHEA: 0
SHORTNESS OF BREATH: 0
NAUSEA: 0
COUGH: 1
RHINORRHEA: 0
CONSTIPATION: 0

## 2020-04-01 NOTE — PROGRESS NOTES
and health maintenance recommendations. Of that 15 minutes I spent 15 minutes on counseling and/or coordination of care. Deborah Thao received counseling on the following healthy behaviors: social distancing    Reviewed prior labs and health maintenance. Discussed use, benefit, and side effects of prescribed medications. Barriers to medication compliance addressed. All patient questions answered. Patient voiced understanding. Pursuant to the emergency declaration under the [facility] waiver authority and the [employer] and [employer] Act, this Virtual  Visit was conducted, with patient's consent, to reduce the patient's risk of exposure to COVID-19 and provide continuity of care for an established patient. Services were provided through a video synchronous discussion virtually to substitute for in-person clinic visit.

## 2020-08-24 NOTE — TELEPHONE ENCOUNTER
Pt called stating she needs a refill sent on her test strips to her [employer] [facility]. Pt stated she has been only getting a box of 100 but she has been testing more due to her BS being elevated at times so the pt wants to have more sent in. Pt uses One Touch Verio test strips.

## 2020-08-28 ENCOUNTER — HOSPITAL ENCOUNTER (OUTPATIENT)
Dept: MAMMOGRAPHY | Age: 70
Discharge: HOME | End: 2020-08-28
Payer: MEDICARE

## 2020-08-28 PROCEDURE — 77063 BREAST TOMOSYNTHESIS BI: CPT

## 2020-09-08 ENCOUNTER — PATIENT MESSAGE (OUTPATIENT)
Dept: FAMILY MEDICINE CLINIC | Age: 70
End: 2020-09-08

## 2020-09-08 NOTE — TELEPHONE ENCOUNTER
From: Mahamed Giron  To: Carolyn Tovar MD  Sent: 9/8/2020 10:17 AM EDT  Subject: Prescription Question    on 8/24/20 I talked to Kelvin Gutierrez at Dr Fiona Reyna office and asked for new script for blood glucose test strips refillable for year to be sent. I explained I have been having some wonky readings and testing more often. It looks like there is a new order in my Chart for TID. However I have not recieved the strips. I called today and they did not have the order. They tried to connect with your office while we were on phone but phone just rang and rang. they called [phone]. Sooooogeovanna. Denisa Duke I need test strips order for one touch verio IQ. refillable for 1 year. It looks like new order was for 3 X day. After i talked to medical supply rep today, i found out that since jan 2020 [facility] has changed name to : [facility]. phone number is the same but fax they gave me is different so maybe that is the issue. [facility] phone #s  phone is [phone]  fax now is [phone]      thanks a bunch for your help. please let me know someone is looking into this. Mulu Lenz  [phone]  [email or web address]

## 2020-09-08 NOTE — TELEPHONE ENCOUNTER
Spoke to the pt stating that if we try to print off and fax the script to [facility] that may work to get the test strips for the pt.      [facility]  Phone: [phone]  Fax: [phone]

## 2020-09-09 ENCOUNTER — HOSPITAL ENCOUNTER (OUTPATIENT)
Age: 70
Discharge: HOME | End: 2020-09-09
Payer: MEDICARE

## 2020-09-09 DIAGNOSIS — E11.65 TYPE 2 DIABETES MELLITUS WITH HYPERGLYCEMIA, WITHOUT LONG-TERM CURRENT USE OF INSULIN (HCC): ICD-10-CM

## 2020-09-09 DIAGNOSIS — E03.9 ACQUIRED HYPOTHYROIDISM: ICD-10-CM

## 2020-09-09 LAB
ALBUMIN SERPL-MCNC: 4 G/DL (ref 3.5–5.1)
ALP BLD-CCNC: 86 U/L (ref 38–126)
ALT SERPL-CCNC: 34 U/L (ref 11–66)
ANION GAP SERPL CALCULATED.3IONS-SCNC: 11 MEQ/L (ref 8–16)
AST SERPL-CCNC: 28 U/L (ref 5–40)
AVERAGE GLUCOSE: 123 MG/DL (ref 70–126)
BASOPHILS # BLD: 1.6 %
BASOPHILS ABSOLUTE: 0.1 THOU/MM3 (ref 0–0.1)
BILIRUB SERPL-MCNC: 0.9 MG/DL (ref 0.3–1.2)
BUN BLDV-MCNC: 11 MG/DL (ref 7–22)
CALCIUM SERPL-MCNC: 9.5 MG/DL (ref 8.5–10.5)
CHLORIDE BLD-SCNC: 106 MEQ/L (ref 98–111)
CHOLESTEROL, TOTAL: 137 MG/DL (ref 100–199)
CO2: 26 MEQ/L (ref 23–33)
CREAT SERPL-MCNC: 0.8 MG/DL (ref 0.4–1.2)
CREATININE URINE: 213.2 MG/DL
EOSINOPHIL # BLD: 3.4 %
EOSINOPHILS ABSOLUTE: 0.2 THOU/MM3 (ref 0–0.4)
ERYTHROCYTE [DISTWIDTH] IN BLOOD BY AUTOMATED COUNT: 13.7 % (ref 11.5–14.5)
ERYTHROCYTE [DISTWIDTH] IN BLOOD BY AUTOMATED COUNT: 46.9 FL (ref 35–45)
GFR SERPL CREATININE-BSD FRML MDRD: 71 ML/MIN/1.73M2
GLUCOSE BLD-MCNC: 124 MG/DL (ref 70–108)
HBA1C MFR BLD: 6.1 % (ref 4.4–6.4)
HCT VFR BLD CALC: 40.4 % (ref 37–47)
HDLC SERPL-MCNC: 41 MG/DL
HEMOGLOBIN: 12.9 GM/DL (ref 12–16)
IMMATURE GRANS (ABS): 0.01 THOU/MM3 (ref 0–0.07)
IMMATURE GRANULOCYTES: 0.2 %
LDL CHOLESTEROL CALCULATED: 75 MG/DL
LYMPHOCYTES # BLD: 27.4 %
LYMPHOCYTES ABSOLUTE: 1.4 THOU/MM3 (ref 1–4.8)
MCH RBC QN AUTO: 30 PG (ref 26–33)
MCHC RBC AUTO-ENTMCNC: 31.9 GM/DL (ref 32.2–35.5)
MCV RBC AUTO: 94 FL (ref 81–99)
MONOCYTES # BLD: 8.9 %
MONOCYTES ABSOLUTE: 0.5 THOU/MM3 (ref 0.4–1.3)
NUCLEATED RED BLOOD CELLS: 0 /100 WBC
PLATELET # BLD: 229 THOU/MM3 (ref 130–400)
PMV BLD AUTO: 11.2 FL (ref 9.4–12.4)
POTASSIUM SERPL-SCNC: 4.1 MEQ/L (ref 3.5–5.2)
PROT/CREAT RATIO, UR: 0.04
PROTEIN, URINE: 8.8 MG/DL
RBC # BLD: 4.3 MILL/MM3 (ref 4.2–5.4)
SEG NEUTROPHILS: 58.5 %
SEGMENTED NEUTROPHILS ABSOLUTE COUNT: 3 THOU/MM3 (ref 1.8–7.7)
SODIUM BLD-SCNC: 143 MEQ/L (ref 135–145)
T4 FREE: 0.86 NG/DL (ref 0.93–1.76)
TOTAL PROTEIN: 6.6 G/DL (ref 6.1–8)
TRIGL SERPL-MCNC: 104 MG/DL (ref 0–199)
TSH SERPL DL<=0.05 MIU/L-ACNC: 4.71 UIU/ML (ref 0.4–4.2)
WBC # BLD: 5.1 THOU/MM3 (ref 4.8–10.8)

## 2020-09-09 PROCEDURE — 83036 HEMOGLOBIN GLYCOSYLATED A1C: CPT

## 2020-09-09 PROCEDURE — 84156 ASSAY OF PROTEIN URINE: CPT

## 2020-09-09 PROCEDURE — 82570 ASSAY OF URINE CREATININE: CPT

## 2020-09-09 PROCEDURE — 36415 COLL VENOUS BLD VENIPUNCTURE: CPT

## 2020-09-09 PROCEDURE — 84439 ASSAY OF FREE THYROXINE: CPT

## 2020-09-09 PROCEDURE — 84443 ASSAY THYROID STIM HORMONE: CPT

## 2020-09-09 PROCEDURE — 80061 LIPID PANEL: CPT

## 2020-09-09 PROCEDURE — 85025 COMPLETE CBC W/AUTO DIFF WBC: CPT

## 2020-09-09 PROCEDURE — 80053 COMPREHEN METABOLIC PANEL: CPT

## 2020-09-10 ENCOUNTER — PATIENT MESSAGE (OUTPATIENT)
Dept: FAMILY MEDICINE CLINIC | Age: 70
End: 2020-09-10

## 2020-09-10 RX ORDER — LEVOTHYROXINE AND LIOTHYRONINE 57; 13.5 UG/1; UG/1
90 TABLET ORAL DAILY
Qty: 90 TABLET | Refills: 3 | Status: SHIPPED
Start: 2020-09-10 | End: 2020-10-26 | Stop reason: SDUPTHER

## 2020-09-10 RX ORDER — LEVOTHYROXINE AND LIOTHYRONINE 38; 9 UG/1; UG/1
60 TABLET ORAL DAILY
Qty: 90 TABLET | Refills: 3 | Status: SHIPPED
Start: 2020-09-10 | End: 2020-10-26 | Stop reason: SDUPTHER

## 2020-09-10 NOTE — TELEPHONE ENCOUNTER
From: David Grande  To: Sanket Malagon MD  Sent: 9/10/2020 10:07 AM EDT  Subject: Prescription Question    Hi Dr Sara St,   Nurse told me that my TSH was not right and that it meant I was not taking enough thyroid med. I looked up hypothyroid and have been having many of the low effects . Ie  Tiredness. Absolutely need a nap mid day. Concentration issues. Wake up one day and no motivation. Struggling keeping my weight off. Dry skin , feel hair falling on my skin after brushing. Some swelling and joint pain. Using CBD, cherry juice, raisens soaked in gin, Motrin etc. some days are super and some days feel like   [place] mortis has set in. So rather than wait 6 weeks and redo test, wondering if i can bump up my thyroid to 1.5 grain 5 x week (now on 1.5 four times a week)  And 1 gr 2 days a week (now 1 gr. 3 days week) and then recheck. Maybe I will feel better faster. ? Unless there is reason to wait. I have been taking thyroid on empty stomach and wait about hour til I eat. I forget it occasionally before I eat but never don't take it. Thanks, appreciate you and your staff. Always prompt and courteous.    Shara Roe  [phone]

## 2020-10-19 ENCOUNTER — OFFICE VISIT (OUTPATIENT)
Dept: FAMILY MEDICINE CLINIC | Age: 70
End: 2020-10-19
Payer: MEDICARE

## 2020-10-19 VITALS
HEART RATE: 106 BPM | DIASTOLIC BLOOD PRESSURE: 66 MMHG | HEIGHT: 66 IN | BODY MASS INDEX: 31.72 KG/M2 | OXYGEN SATURATION: 97 % | SYSTOLIC BLOOD PRESSURE: 124 MMHG | TEMPERATURE: 97.4 F | RESPIRATION RATE: 16 BRPM | WEIGHT: 197.4 LBS

## 2020-10-19 PROCEDURE — 99214 OFFICE O/P EST MOD 30 MIN: CPT | Performed by: FAMILY MEDICINE

## 2020-10-19 RX ORDER — GABAPENTIN 100 MG/1
100 CAPSULE ORAL 3 TIMES DAILY
Qty: 270 CAPSULE | Refills: 0 | Status: SHIPPED | OUTPATIENT
Start: 2020-10-19 | End: 2021-06-29 | Stop reason: ALTCHOICE

## 2020-10-19 SDOH — ECONOMIC STABILITY: TRANSPORTATION INSECURITY
IN THE PAST 12 MONTHS, HAS THE LACK OF TRANSPORTATION KEPT YOU FROM MEDICAL APPOINTMENTS OR FROM GETTING MEDICATIONS?: NO

## 2020-10-19 SDOH — ECONOMIC STABILITY: INCOME INSECURITY: HOW HARD IS IT FOR YOU TO PAY FOR THE VERY BASICS LIKE FOOD, HOUSING, MEDICAL CARE, AND HEATING?: NOT HARD AT ALL

## 2020-10-19 SDOH — ECONOMIC STABILITY: FOOD INSECURITY: WITHIN THE PAST 12 MONTHS, YOU WORRIED THAT YOUR FOOD WOULD RUN OUT BEFORE YOU GOT MONEY TO BUY MORE.: NEVER TRUE

## 2020-10-19 SDOH — ECONOMIC STABILITY: FOOD INSECURITY: WITHIN THE PAST 12 MONTHS, THE FOOD YOU BOUGHT JUST DIDN'T LAST AND YOU DIDN'T HAVE MONEY TO GET MORE.: NEVER TRUE

## 2020-10-19 SDOH — ECONOMIC STABILITY: TRANSPORTATION INSECURITY
IN THE PAST 12 MONTHS, HAS LACK OF TRANSPORTATION KEPT YOU FROM MEETINGS, WORK, OR FROM GETTING THINGS NEEDED FOR DAILY LIVING?: NO

## 2020-10-19 ASSESSMENT — PATIENT HEALTH QUESTIONNAIRE - PHQ9
SUM OF ALL RESPONSES TO PHQ QUESTIONS 1-9: 0
SUM OF ALL RESPONSES TO PHQ9 QUESTIONS 1 & 2: 0
SUM OF ALL RESPONSES TO PHQ QUESTIONS 1-9: 0
SUM OF ALL RESPONSES TO PHQ QUESTIONS 1-9: 0
2. FEELING DOWN, DEPRESSED OR HOPELESS: 0
1. LITTLE INTEREST OR PLEASURE IN DOING THINGS: 0

## 2020-10-19 ASSESSMENT — ENCOUNTER SYMPTOMS
COUGH: 0
CONSTIPATION: 0
SORE THROAT: 0
WHEEZING: 0
NAUSEA: 0
SHORTNESS OF BREATH: 0
ABDOMINAL PAIN: 0
DIARRHEA: 0
EYE DISCHARGE: 0
BACK PAIN: 1
RHINORRHEA: 0

## 2020-10-19 NOTE — PROGRESS NOTES
[address] [place] [address] DR. Peterson [place] [address]  Dept: [phone]  Dept Fax: [phone]  Loc: [phone]    Vic Doan is a 72 y.o. female who presents today for her medical conditions/complaints as noted below. Chief Complaint   Patient presents with    Back Pain     Tingling in the feet. MRI in June of 2019           HPI:     Patient presents for follow-up of chronic conditions and with complaints of bilateral numbness and tingling of her feet. She is a known diabetic but her sugars have been stable over the past several years. Her last hemoglobin A1c is 6.1. She states that she checks her sugars regularly and the highest she ever sees is about 140. She denies any signs or symptoms of hypoglycemia. Next, she has hypothyroidism and her labs were slightly off at recent draw so she has been taking 90 mcg of thyroid medicine 5 days a week and 60 mcg 2 days a week. She is due for repeat labs on Thursday. She denies any issues with diarrhea or constipation and denies fatigue or dry skin. Next, she has chronic low back pain and she has seen surgery for this who has said it is just degenerative disc disease and she should not have surgery at this time. She also seeing chiropractor who she states helped significantly however the numbness and tingling persist.  She mentions that it is worse on the right but both legs are tingling. Otherwise feeling well without complaints.   Lab Results       Component                Value               Date                       WBC                      5.1                 09/09/2020                 HGB                      12.9                09/09/2020                 HCT                      40.4                09/09/2020                 PLT                      229                 09/09/2020                 CHOL                     137                 09/09/2020                 TRIG 104                 2020                 HDL                      41                  2020                 ALT                      34                  2020                 AST                      28                  2020                 NA                       143                 2020                 K                        4.1                 2020                 CL                       106                 2020                 CREATININE               0.8                 2020                 BUN                      11                  2020                 CO2                      26                  2020                 TSH                      4.710 (H)           2020                 LABA1C                   6.1                 2020                          Past Medical History:   Diagnosis Date    Hypothyroidism     Insulin resistance     Pancreatitis     r/t Gall bladder      Past Surgical History:   Procedure Laterality Date     SECTION      CHOLECYSTECTOMY      HYSTERECTOMY      TONSILLECTOMY         Family History   Problem Relation Age of Onset    Diabetes Mother     Heart Disease Mother     Cancer Mother     Heart Disease Father     Cancer Father         bone    Heart Disease Paternal Uncle     Heart Disease Paternal Grandmother     Heart Disease Paternal Grandfather        Social History     Tobacco Use    Smoking status: Never Smoker    Smokeless tobacco: Former User   Substance Use Topics    Alcohol use: No      Current Outpatient Medications   Medication Sig Dispense Refill    Multiple Vitamins-Minerals (WOMENS MULTIVITAMIN PO) Take by mouth Chewable      NONFORMULARY [employer] triple joint with glucosamine and condrotin.  gabapentin (NEURONTIN) 100 MG capsule Take 1 capsule by mouth 3 times daily for 90 days.  Intended supply: 90 days 270 capsule 0    thyroid (NP THYROID) 90 MG tablet Take 1 tablet by mouth daily Sunday, Tuesday, Wed, Thursday and Saturday. Patient take Natures Pure Thyroid-not [place] Thyroid 90 tablet 3    thyroid (NP THYROID) 60 MG tablet Take 1 tablet by mouth daily Monday and Friday. Natures Pure Thyroid-not [place] 90 tablet 3    metFORMIN (GLUCOPHAGE-XR) 500 MG extended release tablet Take 1 tablet by mouth 2 times daily 180 tablet 3    atorvastatin (LIPITOR) 20 MG tablet Take 1 tablet by mouth daily 90 tablet 3    ibuprofen (ADVIL;MOTRIN) 600 MG tablet Take 1 tablet by mouth every 6 hours as needed for Pain 360 tablet 3    blood glucose test strips (ASCENSIA AUTODISC VI;ONE TOUCH ULTRA TEST VI) strip Test TID. One Touch Verio test strips Dx code E11.9 300 each 3    aspirin 81 MG chewable tablet Take 81 mg by mouth daily      clobetasol (TEMOVATE) 0.05 % cream Apply  topically daily. No current facility-administered medications for this visit. No Known Allergies    Health Maintenance   Topic Date Due    Diabetic foot exam  09[birth year]19/1960    DEXA (modify frequency per FRAX score)  09/19/2006    Annual Wellness Visit (AWV)  05/29/2019    Pneumococcal 65+ years Vaccine (2 of 2 - PPSV23) 12/04/2019    Diabetic microalbuminuria test  10/19/2021 (Originally 12/11/2013)    A1C test (Diabetic or Prediabetic)  09/09/2021    Lipid screen  09/09/2021    Diabetic retinal exam  10/06/2021    Breast cancer screen  08/28/2022    DTaP/Tdap/Td vaccine (3 - Td) 10/23/2028    Colon cancer screen colonoscopy  09/10/2029    Flu vaccine  Completed    Shingles Vaccine  Completed    Hepatitis C screen  Completed    Hepatitis A vaccine  Aged Out    Hib vaccine  Aged Out    Meningococcal (ACWY) vaccine  Aged Out       Subjective:      Review of Systems   Constitutional: Negative for chills, fatigue and fever. HENT: Positive for congestion and postnasal drip. Negative for rhinorrhea and sore throat. Eyes: Negative for discharge and visual disturbance. Respiratory: Negative for cough, shortness of breath and wheezing. Cardiovascular: Negative for chest pain and palpitations. Gastrointestinal: Negative for abdominal pain, constipation, diarrhea and nausea. Genitourinary: Negative for dysuria and hematuria. Musculoskeletal: Positive for back pain. Negative for arthralgias and myalgias. Neurological: Positive for numbness (feet). Negative for dizziness and headaches. Psychiatric/Behavioral: Negative for sleep disturbance. The patient is not nervous/anxious. Objective:     Physical Exam  Vitals signs and nursing note reviewed. Constitutional:       General: She is not in acute distress. Appearance: She is well-developed. HENT:      Head: Normocephalic and atraumatic. Right Ear: Hearing, tympanic membrane, ear canal and external ear normal.      Left Ear: Hearing, tympanic membrane, ear canal and external ear normal.      Nose:      Right Sinus: No maxillary sinus tenderness or frontal sinus tenderness. Left Sinus: No maxillary sinus tenderness or frontal sinus tenderness. Eyes:      General: No scleral icterus. Right eye: No discharge. Left eye: No discharge. Conjunctiva/sclera: Conjunctivae normal.      Pupils: Pupils are equal, round, and reactive to light. Neck:      Musculoskeletal: Normal range of motion and neck supple. Cardiovascular:      Rate and Rhythm: Normal rate and regular rhythm. Heart sounds: Normal heart sounds. Pulmonary:      Effort: Pulmonary effort is normal. No respiratory distress. Breath sounds: Normal breath sounds. No wheezing. Abdominal:      General: Bowel sounds are normal. There is no distension. Palpations: Abdomen is soft. Tenderness: There is no abdominal tenderness. Musculoskeletal: Normal range of motion. General: No tenderness. Lymphadenopathy:      Cervical: No cervical adenopathy. Skin:     General: Skin is warm and dry. Findings: No rash. Neurological:      General: No focal deficit present. Mental Status: She is alert and oriented to person, place, and time. Mental status is at baseline. Motor: No abnormal muscle tone. Coordination: Coordination normal.   Psychiatric:         Behavior: Behavior normal.         Thought Content: Thought content normal.         Judgment: Judgment normal.     Diabetic foot check: Normal strength and range of motion of toes, feet, and ankles bilaterally. No joint deformity. No cyanosis or clubbing. 100% sensation at all 22 test points with the 10 gram filament. Dorsalis pedis pulses intact bilaterally. Capillary refill at the toes was less than 2 seconds. Light touch sensation intact bilaterally. Hair growth present on feet and toes bilaterally. No skin breakdown, erythema, rub spots, blisters, scaling, or ulcers. No calluses or corns. Toenails thin and not ingrown. No evidence of fungal infection. /66 (Site: Left Upper Arm, Position: Sitting, Cuff Size: Medium Adult)   Pulse 106   Temp 97.4 °F (36.3 °C) (Temporal)   Resp 16   Ht 5' 6\" (1.676 m)   Wt 197 lb 6.4 oz (89.5 kg)   SpO2 97%   BMI 31.86 kg/m²     Assessment:       Diagnosis Orders   1. Type 2 diabetes mellitus with hyperglycemia, without long-term current use of insulin (HCC)  [facility] DIABETES FOOT EXAM   2. At high risk for falls     3. Numbness and tingling of both feet  Vitamin B12 & Folate    gabapentin (NEURONTIN) 100 MG capsule   4. DDD (degenerative disc disease), lumbar     5. Hypothyroidism, unspecified type         Plan:   DM- controlled, cont meds  Safety precautions given  Numbness likely from DDD. Will check b12 to be sure. neurontin qhs to help with symptoms  Will rechekc labs to eval thyroid status. Discussed use, benefit, and side effects of prescribed medications. Barriers tomedication compliance addressed. All patient questions answered. Pt voiced understanding.      No follow-ups on file.    Orders Placed This Encounter   Procedures    Vitamin B12 & Folate     Standing Status:   Future     Standing Expiration Date:   10/19/2021     DIABETES FOOT EXAM     Orders Placed This Encounter   Medications    gabapentin (NEURONTIN) 100 MG capsule     Sig: Take 1 capsule by mouth 3 times daily for 90 days. Intended supply: 90 days     Dispense:  270 capsule     Refill:  0        Reccommended tobacco cessation options including pharmacologicmethods, counseled great than 3 minutes during this visit:  Yes  []  No  []     Patient given educational materials - see patient instructions. Discussed use, benefit, and sideeffects of prescribed medications. All patient questions answered. Pt voiced understanding. Reviewed health maintenance. Instructed to continue current medications, diet and exercise. Patient agreed with treatment plan. Follow up as directed.      Electronically signed by Merline Nielsen, MD on 10/19/2020 at 12:53 PM

## 2020-10-19 NOTE — PATIENT INSTRUCTIONS
Patient Education        Preventing Falls: Care Instructions  Your Care Instructions     Getting around your home safely can be a challenge if you have injuries or health problems that make it easy for you to fall. Loose rugs and furniture in walkways are among the dangers for many older people who have problems walking or who have poor eyesight. People who have conditions such as arthritis, osteoporosis, or dementia also have to be careful not to fall. You can make your home safer with a few simple measures. Follow-up care is a key part of your treatment and safety. Be sure to make and go to all appointments, and call your doctor if you are having problems. It's also a good idea to know your test results and keep a list of the medicines you take. How can you care for yourself at home? Taking care of yourself  · You may get dizzy if you do not drink enough water. To prevent dehydration, drink plenty of fluids, enough so that your urine is light yellow or clear like water. Choose water and other caffeine-free clear liquids. If you have kidney, heart, or liver disease and have to limit fluids, talk with your doctor before you increase the amount of fluids you drink. · Exercise regularly to improve your strength, muscle tone, and balance. Walk if you can. Swimming may be a good choice if you cannot walk easily. · Have your vision and hearing checked each year or any time you notice a change. If you have trouble seeing and hearing, you might not be able to avoid objects and could lose your balance. · Know the side effects of the medicines you take. Ask your doctor or pharmacist whether the medicines you take can affect your balance. Sleeping pills or sedatives can affect your balance. · Limit the amount of alcohol you drink. Alcohol can impair your balance and other senses. · Ask your doctor whether calluses or corns on your feet need to be removed.  If you wear loose-fitting shoes because of calluses or corns, you can lose your balance and fall. · Talk to your doctor if you have numbness in your feet. Preventing falls at home  · Remove raised doorway thresholds, throw rugs, and clutter. Repair loose carpet or raised areas in the floor. · Move furniture and electrical cords to keep them out of walking paths. · Use nonskid floor wax, and wipe up spills right away, especially on ceramic tile floors. · If you use a walker or cane, put rubber tips on it. If you use crutches, clean the bottoms of them regularly with an abrasive pad, such as steel wool. · Keep your house well lit, especially [address], and outside walkways. Use night-lights in areas such as hallways and bathrooms. Add extra light switches or use remote switches (such as switches that go on or off when you clap your hands) to make it easier to turn lights on if you have to get up during the night. · Install sturdy handrails on stairways. · Move items in your cabinets so that the things you use a lot are on the lower shelves (about waist level). · Keep a cordless phone and a flashlight with new batteries by your bed. If possible, put a phone in each of the main rooms of your house, or carry a cell phone in case you fall and cannot reach a phone. Or, you can wear a device around your neck or wrist. You push a button that sends a signal for help. · Wear low-heeled shoes that fit well and give your feet good support. Use footwear with nonskid soles. Check the heels and soles of your shoes for wear. Repair or replace worn heels or soles. · Do not wear socks without shoes on wood floors. · Walk on the grass when the sidewalks are slippery. If you live in an area that gets snow and ice in the winter, sprinkle salt on slippery steps and sidewalks. Preventing falls in the bath  · Install grab bars and nonskid mats inside and outside your shower or tub and near the toilet and sinks. · Use shower chairs and bath benches.   · Use a hand-held shower head that will allow you to sit while showering. · Get into a tub or shower by putting the weaker leg in first. Get out of a tub or shower with your strong side first.  · Repair loose toilet seats and consider installing a raised toilet seat to make getting on and off the toilet easier. · Keep your bathroom door unlocked while you are in the shower. Where can you learn more? Go to https://[email or web address]. org and sign in to your [employer] account. Enter [phone] in the [employer] box to learn more about \"Preventing Falls: Care Instructions. \"     If you do not have an account, please click on the \"Sign Up Now\" link. Current as of: April 15, 2020               Content Version: 12.6  © 0324-8929 [employer]. Care instructions adapted under license by [facility]. If you have questions about a medical condition or this instruction, always ask your healthcare professional. [address] any warranty or liability for your use of this information.

## 2020-10-23 ENCOUNTER — PATIENT MESSAGE (OUTPATIENT)
Dept: FAMILY MEDICINE CLINIC | Age: 70
End: 2020-10-23

## 2020-10-23 LAB
FOLATE: >25 NG/ML
TSH SERPL DL<=0.05 MIU/L-ACNC: 1.65 UIU/ML (ref 0.49–4.67)
VITAMIN B-12: 327 PG/ML (ref 180–914)

## 2020-10-26 RX ORDER — LEVOTHYROXINE AND LIOTHYRONINE 38; 9 UG/1; UG/1
60 TABLET ORAL DAILY
Qty: 24 TABLET | Refills: 3 | Status: SHIPPED | OUTPATIENT
Start: 2020-10-26 | End: 2021-06-29 | Stop reason: SDUPTHER

## 2020-10-26 RX ORDER — LEVOTHYROXINE AND LIOTHYRONINE 57; 13.5 UG/1; UG/1
90 TABLET ORAL DAILY
Qty: 66 TABLET | Refills: 3 | Status: SHIPPED | OUTPATIENT
Start: 2020-10-26 | End: 2021-06-29

## 2020-10-26 NOTE — TELEPHONE ENCOUNTER
From: [facility]  To: Pedro Martines MD  Sent: 10/23/2020 10:35 AM EDT  Subject: Prescription Question    just a reminder     Tsh was low 6 weeks ago. upped my doseage to  90 mg 5 days week  60 mg 2 days week. lab done 10/22 looks like this may be approp dosage. if this is correct dosage, will need to have prescription updated  [facility] and get 90 day supply. 1 year rx. NP thyroid 90 mg 66 tab  NP thyroid 60 mg 24 tab    looks like B 12 is high enough?     thanks  stephanie alarcon

## 2020-11-19 ENCOUNTER — OFFICE VISIT (OUTPATIENT)
Dept: FAMILY MEDICINE CLINIC | Age: 70
End: 2020-11-19
Payer: MEDICARE

## 2020-11-19 VITALS
HEART RATE: 96 BPM | TEMPERATURE: 97 F | OXYGEN SATURATION: 96 % | RESPIRATION RATE: 16 BRPM | BODY MASS INDEX: 31.64 KG/M2 | DIASTOLIC BLOOD PRESSURE: 80 MMHG | SYSTOLIC BLOOD PRESSURE: 138 MMHG | WEIGHT: 196 LBS

## 2020-11-19 LAB
BILIRUBIN, POC: NEGATIVE
BLOOD URINE, POC: NEGATIVE
CLARITY, POC: CLEAR
COLOR, POC: YELLOW
GLUCOSE URINE, POC: NEGATIVE
KETONES, POC: NORMAL
LEUKOCYTE EST, POC: NEGATIVE
NITRITE, POC: NEGATIVE
PH, POC: 5.5
PROTEIN, POC: NEGATIVE
SPECIFIC GRAVITY, POC: 1.02
TSH SERPL DL<=0.05 MIU/L-ACNC: 1.12 UIU/ML (ref 0.4–4.2)
UROBILINOGEN, POC: 0.2

## 2020-11-19 PROCEDURE — 99214 OFFICE O/P EST MOD 30 MIN: CPT | Performed by: FAMILY MEDICINE

## 2020-11-19 PROCEDURE — 81003 URINALYSIS AUTO W/O SCOPE: CPT | Performed by: FAMILY MEDICINE

## 2020-11-19 PROCEDURE — 36415 COLL VENOUS BLD VENIPUNCTURE: CPT | Performed by: FAMILY MEDICINE

## 2020-11-19 ASSESSMENT — ENCOUNTER SYMPTOMS
CHEST TIGHTNESS: 0
ABDOMINAL PAIN: 0
COUGH: 0
CONSTIPATION: 0
BACK PAIN: 1
RHINORRHEA: 0
DIARRHEA: 0
NAUSEA: 0
SINUS PRESSURE: 0
WHEEZING: 0
EYE DISCHARGE: 0
SORE THROAT: 0
SHORTNESS OF BREATH: 0

## 2020-11-19 NOTE — PROGRESS NOTES
[address] [place] [address] DR. Peterson [place] [address]  Dept: [phone]  Dept Fax: [phone]  Loc: [phone]    Cecelia Gonzalez is a 72 y.o. female who presents today for her medical conditions/complaints as noted below. Chief Complaint   Patient presents with    Tingling     Patient states that her feet having been tingling     Other     Patient also has a rash/discomfort and tingling in the face.  Urinary Frequency           HPI:     Patient presents with several complaints today. She has been having numbness and tingling in her feet for the last several months. She was given gabapentin for this and takes if very rarely since prescribed. States that one incident she felt that there were fire ants crawling all over her legs and took a pill and it seemed to help significantly. However her chiropractor told her that it is addictive and she doesn't want to take anything with addictive properties. Patient states that she has also been having episodes of heart racing, tremors, and light headedness as if her blood sugar is low. She took her blood sugar during these episodes and it was in the 80s and 90s. Feels that her thyroid still may not be in control. She recently increaed her thryoid meds from 90mcg 4 times per week to 5 times per week. Last labs were normal, but tsh slightly lower. Has also been having episodes of urinary frequency and took a urine sample yesterday that was clear and colorless. She has had this happen once before when her thyroid was not within range. Patient states that she has been on metformin for years and feels that it is causing her to have facial flushing a few hours after she takes her metformin. States that she has started taking sublingual vitamin B12 because her level was low normal. Didn't know if that could be contributing to some of her symptoms.  Does admit to some increased anxiety. Past Medical History:   Diagnosis Date    Hypothyroidism     Insulin resistance     Pancreatitis     r/t Gall bladder      Past Surgical History:   Procedure Laterality Date     SECTION      CHOLECYSTECTOMY      HYSTERECTOMY      TONSILLECTOMY         Family History   Problem Relation Age of Onset    Diabetes Mother     Heart Disease Mother     Cancer Mother     Heart Disease Father     Cancer Father         bone    Heart Disease Paternal Uncle     Heart Disease Paternal Grandmother     Heart Disease Paternal Grandfather        Social History     Tobacco Use    Smoking status: Never Smoker    Smokeless tobacco: Former User   Substance Use Topics    Alcohol use: No      Current Outpatient Medications   Medication Sig Dispense Refill    thyroid (NP THYROID) 90 MG tablet Take 1 tablet by mouth daily , Tuesday, Wednesday, Thursday and Saturday. Patient take Natures Pure Thyroid-not [place] Thyroid 66 tablet 3    thyroid (NP THYROID) 60 MG tablet Take 1 tablet by mouth daily Monday and Friday. Natures Pure Thyroid-not [place] 24 tablet 3    Multiple Vitamins-Minerals (WOMENS MULTIVITAMIN PO) Take by mouth Chewable      NONFORMULARY [employer] triple joint with glucosamine and condrotin.  gabapentin (NEURONTIN) 100 MG capsule Take 1 capsule by mouth 3 times daily for 90 days. Intended supply: 90 days 270 capsule 0    metFORMIN (GLUCOPHAGE-XR) 500 MG extended release tablet Take 1 tablet by mouth 2 times daily 180 tablet 3    atorvastatin (LIPITOR) 20 MG tablet Take 1 tablet by mouth daily 90 tablet 3    ibuprofen (ADVIL;MOTRIN) 600 MG tablet Take 1 tablet by mouth every 6 hours as needed for Pain 360 tablet 3    blood glucose test strips (ASCENSIA AUTODISC VI;ONE TOUCH ULTRA TEST VI) strip Test TID.    One Touch Verio test strips Dx code E11.9 300 each 3    aspirin 81 MG chewable tablet Take 81 mg by mouth daily      clobetasol (TEMOVATE) 0.05 % cream Apply topically daily. No current facility-administered medications for this visit. No Known Allergies    Health Maintenance   Topic Date Due    DEXA (modify frequency per FRAX score)  09/19/2006    Annual Wellness Visit (AWV)  05/29/2019    Pneumococcal 65+ years Vaccine (2 of 2 - PPSV23) 12/04/2019    Diabetic microalbuminuria test  10/19/2021 (Originally 12/11/2013)    A1C test (Diabetic or Prediabetic)  09/09/2021    Lipid screen  09/09/2021    Diabetic retinal exam  10/06/2021    Diabetic foot exam  10/19/2021    TSH testing  10/22/2021    Breast cancer screen  08/28/2022    DTaP/Tdap/Td vaccine (3 - Td) 10/23/2028    Colon cancer screen colonoscopy  09/10/2029    Flu vaccine  Completed    Shingles Vaccine  Completed    Hepatitis C screen  Completed    Hepatitis A vaccine  Aged Out    Hib vaccine  Aged Out    Meningococcal (ACWY) vaccine  Aged Out       Subjective:      Review of Systems   Constitutional: Positive for diaphoresis. Negative for activity change, appetite change, chills, fatigue, fever and unexpected weight change. HENT: Negative for congestion, postnasal drip, rhinorrhea, sinus pressure and sore throat. Eyes: Negative for discharge and visual disturbance. Respiratory: Negative for cough, chest tightness, shortness of breath and wheezing. Cardiovascular: Positive for palpitations. Negative for chest pain and leg swelling. Gastrointestinal: Negative for abdominal pain, constipation, diarrhea and nausea. Endocrine: Negative for cold intolerance and heat intolerance. Genitourinary: Positive for frequency and urgency. Negative for dysuria, hematuria and pelvic pain. Musculoskeletal: Positive for back pain. Negative for arthralgias and myalgias. Skin: Positive for rash (from wearing mask). Neurological: Positive for tremors, light-headedness and numbness. Negative for dizziness and headaches.         Tingling in bilateral lower extremities Psychiatric/Behavioral: Negative for decreased concentration, dysphoric mood and sleep disturbance. The patient is nervous/anxious. Objective:     Physical Exam  Vitals signs and nursing note reviewed. Constitutional:       Appearance: She is well-developed. HENT:      Head: Normocephalic and atraumatic. Eyes:      General: No scleral icterus. Right eye: No discharge. Left eye: No discharge. Conjunctiva/sclera: Conjunctivae normal.   Neck:      Musculoskeletal: Normal range of motion. Cardiovascular:      Rate and Rhythm: Normal rate and regular rhythm. Heart sounds: Normal heart sounds. Pulmonary:      Effort: Pulmonary effort is normal.      Breath sounds: Normal breath sounds. No wheezing. Abdominal:      General: Bowel sounds are normal. There is no distension. Palpations: Abdomen is soft. Tenderness: There is no abdominal tenderness. Musculoskeletal:         General: No tenderness. Lymphadenopathy:      Cervical: No cervical adenopathy. Skin:     General: Skin is warm and dry. Neurological:      Mental Status: She is alert and oriented to person, place, and time. Coordination: Coordination normal.   Psychiatric:         Behavior: Behavior normal.         Thought Content: Thought content normal.         Judgment: Judgment normal.       /80 (Site: Left Upper Arm, Position: Sitting, Cuff Size: Medium Adult)   Pulse 96   Temp 97 °F (36.1 °C) (Temporal)   Resp 16   Wt 196 lb (88.9 kg)   SpO2 96%   BMI 31.64 kg/m²     Assessment:       Diagnosis Orders   1. Hypothyroidism, unspecified type  TSH with Reflex    TSH with Reflex   2. Urinary frequency  POCT Urinalysis No Micro (Auto)       Plan: Will recheck thyroid labs. Consider lowering thyroid if needed. ua wnl. Also pt to try every other day on metformin and monitor sugars. Discussed use, benefit, and side effects of prescribed medications.   Barriers tomedication compliance addressed. All patient questions answered. Pt voiced understanding. No follow-ups on file. Orders Placed This Encounter   Procedures    TSH with Reflex     Standing Status:   Future     Number of Occurrences:   1     Standing Expiration Date:   11/19/2021    POCT Urinalysis No Micro (Auto)     No orders of the defined types were placed in this encounter. Reccommended tobacco cessation options including pharmacologicmethods, counseled great than 3 minutes during this visit:  Yes  []  No  []     Patient given educational materials - see patient instructions. Discussed use, benefit, and sideeffects of prescribed medications. All patient questions answered. Pt voiced understanding. Reviewed health maintenance. Instructed to continue current medications, diet and exercise. Patient agreed with treatment plan. Follow up as directed.      Electronically signed by Aurora Alexandre MD on 11/19/2020 at 10:59 AM

## 2021-04-13 ENCOUNTER — OFFICE VISIT (OUTPATIENT)
Dept: FAMILY MEDICINE CLINIC | Age: 71
End: 2021-04-13
Payer: MEDICARE

## 2021-04-13 VITALS
OXYGEN SATURATION: 97 % | BODY MASS INDEX: 32.64 KG/M2 | WEIGHT: 202.2 LBS | SYSTOLIC BLOOD PRESSURE: 132 MMHG | DIASTOLIC BLOOD PRESSURE: 82 MMHG | RESPIRATION RATE: 16 BRPM | HEART RATE: 86 BPM | TEMPERATURE: 97.2 F

## 2021-04-13 DIAGNOSIS — E11.65 TYPE 2 DIABETES MELLITUS WITH HYPERGLYCEMIA, WITHOUT LONG-TERM CURRENT USE OF INSULIN (HCC): Primary | ICD-10-CM

## 2021-04-13 DIAGNOSIS — E03.9 HYPOTHYROIDISM, UNSPECIFIED TYPE: ICD-10-CM

## 2021-04-13 DIAGNOSIS — R23.2 FLUSHING: ICD-10-CM

## 2021-04-13 LAB — HBA1C MFR BLD: 6.2 %

## 2021-04-13 PROCEDURE — 99214 OFFICE O/P EST MOD 30 MIN: CPT | Performed by: FAMILY MEDICINE

## 2021-04-13 PROCEDURE — 36415 COLL VENOUS BLD VENIPUNCTURE: CPT | Performed by: FAMILY MEDICINE

## 2021-04-13 PROCEDURE — 83036 HEMOGLOBIN GLYCOSYLATED A1C: CPT | Performed by: FAMILY MEDICINE

## 2021-04-13 RX ORDER — METFORMIN HYDROCHLORIDE 500 MG/1
TABLET, EXTENDED RELEASE ORAL
Qty: 60 TABLET | Refills: 2
Start: 2021-04-13 | End: 2021-08-19

## 2021-04-13 ASSESSMENT — PATIENT HEALTH QUESTIONNAIRE - PHQ9
SUM OF ALL RESPONSES TO PHQ9 QUESTIONS 1 & 2: 0
1. LITTLE INTEREST OR PLEASURE IN DOING THINGS: 0
SUM OF ALL RESPONSES TO PHQ QUESTIONS 1-9: 0
2. FEELING DOWN, DEPRESSED OR HOPELESS: 0

## 2021-04-13 ASSESSMENT — ENCOUNTER SYMPTOMS
SHORTNESS OF BREATH: 0
CONSTIPATION: 0
CHEST TIGHTNESS: 0
DIARRHEA: 0

## 2021-04-13 NOTE — PROGRESS NOTES
[address]. Peterson [place] [address]  Dept: [phone]  Loc: Andi Montiel [place] (:  1950) is a 72 y.o. female, here for evaluation of the following chief complaint(s):  Diabetes      ASSESSMENT/PLAN:  1. Type 2 diabetes mellitus with hyperglycemia, without long-term current use of insulin (HCC)  -     POCT glycosylated hemoglobin (Hb A1C)  -     Comprehensive Metabolic Panel; Future  -     CBC Auto Differential; Future  2. Hypothyroidism, unspecified type  -     TSH without Reflex; Future  -     T4, Free; Future  -     Thyroid Antibodies; Future  -     Thyroid Peroxidase Antibody; Future  3. Flushing    Labs obtained. A1C 6.2 with taking just 1 metformin every other day. If labs normal, consider stopping metformin all together. Discussed goals of sugars fasting vs postprandial.  Will titrate thyroid to hypo end of normal.   No follow-ups on file. SUBJECTIVE/OBJECTIVE:  Patient presents for follow-up of diabetes, hypothyroidism and flushing. She states that lately her sugars have been elevated. She mentions that after she ate a ham sandwich her blood sugar went up to 175. It was also up to 174 postprandial on another occasion. She states that she does not like her sugar this high. Patient is educated that postprandial sugars under 180 are normal.  She is no longer having hypoglycemic events but is only taking her Metformin 3 times per week. Her A1c today is 6.2. She does state lately that she has felt somewhat flushed and when this happens she checks her urine and has found that she has ketones occasionally. She states that she drinks a lot of water the ketones go away. She mentions that she thinks she is going into a moderate ketotic state. She does eat some carbs but tries to limit them. She is currently doing intermittent thyroid dosing as well.   She is taking her 90 mcg of thyroid medication on 4 days per the week and 60 mcg on 3 days. She does state that she usually feels better when she is a little higher normal TSH. She denies any chest pain or short notes of breath although she mentions while she was in [place] she did have some shortness of breath so she made an appointment with cardiology. She states this is coming up in the next few weeks. Review of Systems   Constitutional: Positive for fatigue. Respiratory: Negative for chest tightness and shortness of breath. Gastrointestinal: Negative for constipation and diarrhea. Endocrine: Positive for heat intolerance. Neurological: Negative for dizziness. Psychiatric/Behavioral: Negative for dysphoric mood. The patient is not nervous/anxious. Physical Exam  Vitals signs and nursing note reviewed. Constitutional:       Appearance: She is well-developed. HENT:      Head: Normocephalic and atraumatic. Eyes:      General: No scleral icterus. Right eye: No discharge. Left eye: No discharge. Conjunctiva/sclera: Conjunctivae normal.   Cardiovascular:      Rate and Rhythm: Normal rate and regular rhythm. Heart sounds: Normal heart sounds. Pulmonary:      Effort: Pulmonary effort is normal.      Breath sounds: Normal breath sounds. No wheezing. Skin:     General: Skin is warm and dry. Neurological:      Mental Status: She is alert and oriented to person, place, and time. Mental status is at baseline. Psychiatric:         Behavior: Behavior normal.         Thought Content:  Thought content normal.         Judgment: Judgment normal.         Vitals:    04/13/21 1422   BP: 132/82   Pulse: 86   Resp: 16   Temp: 97.2 °F (36.2 °C)   SpO2: 97%        On this date 4/13/2021 I have spent 30 minutes reviewing previous notes, test results and face to face with the patient discussing the diagnosis and importance of compliance with the treatment plan as well as documenting on the day of the visit. An electronic signature was used to authenticate this note.     --Fidelia Menard MD

## 2021-04-14 ENCOUNTER — PATIENT MESSAGE (OUTPATIENT)
Dept: FAMILY MEDICINE CLINIC | Age: 71
End: 2021-04-14

## 2021-04-14 DIAGNOSIS — Z83.79 FAMILY HISTORY OF CELIAC DISEASE: Primary | ICD-10-CM

## 2021-04-14 LAB
ALBUMIN SERPL-MCNC: 4.4 G/DL (ref 3.5–5.1)
ALP BLD-CCNC: 92 U/L (ref 38–126)
ALT SERPL-CCNC: 53 U/L (ref 11–66)
ANION GAP SERPL CALCULATED.3IONS-SCNC: 10 MEQ/L (ref 8–16)
AST SERPL-CCNC: 40 U/L (ref 5–40)
BASOPHILS # BLD: 1 %
BASOPHILS ABSOLUTE: 0.1 THOU/MM3 (ref 0–0.1)
BILIRUB SERPL-MCNC: 0.6 MG/DL (ref 0.3–1.2)
BUN BLDV-MCNC: 14 MG/DL (ref 7–22)
CALCIUM SERPL-MCNC: 9.8 MG/DL (ref 8.5–10.5)
CHLORIDE BLD-SCNC: 106 MEQ/L (ref 98–111)
CO2: 25 MEQ/L (ref 23–33)
CREAT SERPL-MCNC: 0.8 MG/DL (ref 0.4–1.2)
EOSINOPHIL # BLD: 1.7 %
EOSINOPHILS ABSOLUTE: 0.1 THOU/MM3 (ref 0–0.4)
ERYTHROCYTE [DISTWIDTH] IN BLOOD BY AUTOMATED COUNT: 13.9 % (ref 11.5–14.5)
ERYTHROCYTE [DISTWIDTH] IN BLOOD BY AUTOMATED COUNT: 46.5 FL (ref 35–45)
GFR SERPL CREATININE-BSD FRML MDRD: 71 ML/MIN/1.73M2
GLUCOSE BLD-MCNC: 87 MG/DL (ref 70–108)
HCT VFR BLD CALC: 42.8 % (ref 37–47)
HEMOGLOBIN: 13.6 GM/DL (ref 12–16)
IMMATURE GRANS (ABS): 0.02 THOU/MM3 (ref 0–0.07)
IMMATURE GRANULOCYTES: 0.3 %
LYMPHOCYTES # BLD: 21.6 %
LYMPHOCYTES ABSOLUTE: 1.6 THOU/MM3 (ref 1–4.8)
MCH RBC QN AUTO: 28.9 PG (ref 26–33)
MCHC RBC AUTO-ENTMCNC: 31.8 GM/DL (ref 32.2–35.5)
MCV RBC AUTO: 91.1 FL (ref 81–99)
MONOCYTES # BLD: 8 %
MONOCYTES ABSOLUTE: 0.6 THOU/MM3 (ref 0.4–1.3)
NUCLEATED RED BLOOD CELLS: 0 /100 WBC
PLATELET # BLD: 249 THOU/MM3 (ref 130–400)
PMV BLD AUTO: 11.7 FL (ref 9.4–12.4)
POTASSIUM SERPL-SCNC: 4.4 MEQ/L (ref 3.5–5.2)
RBC # BLD: 4.7 MILL/MM3 (ref 4.2–5.4)
SEG NEUTROPHILS: 67.4 %
SEGMENTED NEUTROPHILS ABSOLUTE COUNT: 4.9 THOU/MM3 (ref 1.8–7.7)
SODIUM BLD-SCNC: 141 MEQ/L (ref 135–145)
T4 FREE: 0.99 NG/DL (ref 0.93–1.76)
TOTAL PROTEIN: 7.1 G/DL (ref 6.1–8)
TSH SERPL DL<=0.05 MIU/L-ACNC: 1.29 UIU/ML (ref 0.4–4.2)
WBC # BLD: 7.2 THOU/MM3 (ref 4.8–10.8)

## 2021-04-15 NOTE — TELEPHONE ENCOUNTER
From: Zoe Perea  To: Lars Jara MD  Sent: 4/14/2021 8:24 PM EDT  Subject: Non-Urgent Medical Question    Hi Dr Casey Billingsley,  One of the things I wanted ask when I was in the other day was about lab work. Recently our son and his daughter were diagnosed with Celiac disease. Diagnosed by antibody tests and endoscopy/biopsies. Since genetic and not known what side of family it comes from wondering if I should/ could get lab order for tests needed.    Thanks  Shannan

## 2021-04-16 ENCOUNTER — PATIENT MESSAGE (OUTPATIENT)
Dept: FAMILY MEDICINE CLINIC | Age: 71
End: 2021-04-16

## 2021-04-16 LAB
THYROGLOBULIN AB: < 0.9 IU/ML (ref 0–4)
THYROID PEROXIDASE ANTIBODY: < 0.3 IU/ML (ref 0–9)

## 2021-04-22 ENCOUNTER — OFFICE VISIT (OUTPATIENT)
Dept: CARDIOLOGY CLINIC | Age: 71
End: 2021-04-22
Payer: MEDICARE

## 2021-04-22 VITALS
HEIGHT: 66 IN | BODY MASS INDEX: 32.3 KG/M2 | SYSTOLIC BLOOD PRESSURE: 180 MMHG | HEART RATE: 106 BPM | DIASTOLIC BLOOD PRESSURE: 80 MMHG | WEIGHT: 201 LBS

## 2021-04-22 DIAGNOSIS — E78.01 FAMILIAL HYPERCHOLESTEROLEMIA: ICD-10-CM

## 2021-04-22 DIAGNOSIS — Z82.49 FAMILY HISTORY OF EARLY CAD: ICD-10-CM

## 2021-04-22 DIAGNOSIS — R07.9 CHEST PAIN, UNSPECIFIED TYPE: Primary | ICD-10-CM

## 2021-04-22 PROCEDURE — 99204 OFFICE O/P NEW MOD 45 MIN: CPT | Performed by: NUCLEAR MEDICINE

## 2021-04-22 PROCEDURE — 93000 ELECTROCARDIOGRAM COMPLETE: CPT | Performed by: NUCLEAR MEDICINE

## 2021-04-22 ASSESSMENT — ENCOUNTER SYMPTOMS
NAUSEA: 0
BACK PAIN: 0
ABDOMINAL PAIN: 0
CONSTIPATION: 0
ANAL BLEEDING: 0
CHEST TIGHTNESS: 0
DIARRHEA: 0
BLOOD IN STOOL: 0
COLOR CHANGE: 0
ABDOMINAL DISTENTION: 0
VOMITING: 0
RECTAL PAIN: 0
SHORTNESS OF BREATH: 1

## 2021-04-22 NOTE — PROGRESS NOTES
[facility] [address] [place] [address].  [address] [place] [id number]  Dept: [phone]  Dept Fax: [phone]  Loc: [phone]    Visit Date: 2021    Willa Hensley is a 72 y.o. female who presents todayfor:  Chief Complaint   Patient presents with    New Patient    Diabetes    Hyperlipidemia    Shortness of Breath     Here for the first time  Used to see tank in   Had a stress test and echo then   Were okay   Here for evaluation now  Does have multiple risk factors for CAD  Does have DM for more than 10 years   Does have hyperlipidemia as well  On statins for that   Does have HTN that seems stable but higher today   Higher HR today   Lately had some more dyspnea  More than usual   Progressive in nature  Does have palpitation at times  Some shoulder pain and neck pain   No known CAD before  No smoking  Does have FH of CAD         HPI:  HPI  Past Medical History:   Diagnosis Date    Hypothyroidism     Insulin resistance     Pancreatitis     r/t Gall bladder      Past Surgical History:   Procedure Laterality Date     SECTION      CHOLECYSTECTOMY      HYSTERECTOMY      TONSILLECTOMY       Family History   Problem Relation Age of Onset    Diabetes Mother     Heart Disease Mother     Cancer Mother     Heart Disease Father     Cancer Father         bone    Heart Disease Paternal Uncle     Heart Disease Paternal Grandmother     Heart Disease Paternal Grandfather      Social History     Tobacco Use    Smoking status: Never Smoker    Smokeless tobacco: Former User   Substance Use Topics    Alcohol use: No      Current Outpatient Medications   Medication Sig Dispense Refill    metFORMIN (GLUCOPHAGE-XR) 500 MG extended release tablet Once daily 3 days a week. 60 tablet 2    thyroid (NP THYROID) 90 MG tablet Take 1 tablet by mouth daily , Tuesday, Wednesday, Thursday and Saturday.  Patient take Natures Pure Thyroid-not [place] Thyroid 66 tablet 3    thyroid (NP THYROID) 60 MG tablet Take 1 tablet by mouth daily Monday and Friday. Natures Pure Thyroid-not [place] 24 tablet 3    Multiple Vitamins-Minerals (WOMENS MULTIVITAMIN PO) Take by mouth Chewable      atorvastatin (LIPITOR) 20 MG tablet Take 1 tablet by mouth daily 90 tablet 3    ibuprofen (ADVIL;MOTRIN) 600 MG tablet Take 1 tablet by mouth every 6 hours as needed for Pain 360 tablet 3    blood glucose test strips (ASCENSIA AUTODISC VI;ONE TOUCH ULTRA TEST VI) strip Test TID. One Touch Verio test strips Dx code E11.9 300 each 3    aspirin 81 MG chewable tablet Take 81 mg by mouth daily      clobetasol (TEMOVATE) 0.05 % cream Apply  topically daily.  gabapentin (NEURONTIN) 100 MG capsule Take 1 capsule by mouth 3 times daily for 90 days. Intended supply: 90 days 270 capsule 0     No current facility-administered medications for this visit. No Known Allergies  Health Maintenance   Topic Date Due    DEXA (modify frequency per FRAX score)  Never done    Annual Wellness Visit (AWV)  Never done    Diabetic microalbuminuria test  10/19/2021 (Originally 12/11/2013)    Pneumococcal 65+ years Vaccine (2 of 2 - PPSV23) 04/14/2023 (Originally 12/4/2019)    Lipid screen  09/09/2021    Diabetic foot exam  10/19/2021    A1C test (Diabetic or Prediabetic)  04/13/2022    Diabetic retinal exam  04/13/2022    TSH testing  04/13/2022    Breast cancer screen  08/28/2022    DTaP/Tdap/Td vaccine (3 - Td) 10/23/2028    Colon cancer screen colonoscopy  09/10/2029    Flu vaccine  Completed    Shingles Vaccine  Completed    COVID-19 Vaccine  Completed    Hepatitis C screen  Completed    Hepatitis A vaccine  Aged Out    Hib vaccine  Aged Out    Meningococcal (ACWY) vaccine  Aged Out       Subjective:  Review of Systems   Constitutional: Positive for fatigue. HENT: Negative for ear pain and nosebleeds. Respiratory: Positive for shortness of breath.  Negative for chest tightness. Cardiovascular: Positive for palpitations. Negative for chest pain. Gastrointestinal: Negative for abdominal distention, abdominal pain, anal bleeding, blood in stool, constipation, diarrhea, nausea, rectal pain and vomiting. Endocrine: Negative for polyphagia. Genitourinary: Negative for dysuria and hematuria. Musculoskeletal: Negative for arthralgias, back pain, gait problem, joint swelling, myalgias, neck pain and neck stiffness. Skin: Negative for color change, pallor, rash and wound. Allergic/Immunologic: Negative for food allergies. Neurological: Negative for dizziness and light-headedness. Psychiatric/Behavioral: Negative for confusion, decreased concentration, dysphoric mood, hallucinations and self-injury. The patient is not nervous/anxious and is not hyperactive. Objective:  Physical Exam  HENT:      Head: Normocephalic. Nose: Nose normal.      Mouth/Throat:      Mouth: Mucous membranes are moist.   Eyes:      Pupils: Pupils are equal, round, and reactive to light. Cardiovascular:      Rate and Rhythm: Normal rate and regular rhythm. Heart sounds: Murmur present. Pulmonary:      Effort: No respiratory distress. Breath sounds: No stridor. No wheezing, rhonchi or rales. Chest:      Chest wall: No tenderness. Abdominal:      General: There is no distension. Palpations: There is no mass. Tenderness: There is no abdominal tenderness. There is no right CVA tenderness, left CVA tenderness, guarding or rebound. Hernia: No hernia is present. Musculoskeletal:         General: No swelling, tenderness, deformity or signs of injury. Right lower leg: No edema. Left lower leg: No edema. Skin:     Coloration: Skin is not jaundiced or pale. Findings: No bruising, erythema, lesion or rash. Neurological:      Mental Status: She is alert and oriented to person, place, and time. Cranial Nerves: No cranial nerve deficit. Sensory: No sensory deficit. Motor: No weakness. Coordination: Coordination normal.      Gait: Gait normal.      Deep Tendon Reflexes: Reflexes normal.   Psychiatric:         Mood and Affect: Mood normal.         Thought Content: Thought content normal.       BP (!) 180/80   Pulse 106   Ht 5' 6\" (1.676 m)   Wt 201 lb (91.2 kg)   BMI 32.44 kg/m²     Assessment:      Diagnosis Orders   1. Chest pain, unspecified type  EKG 12 lead   2. Family history of early CAD  EKG 12 lead   3. Familial hypercholesterolemia     as above  Symptoms as above  Higher risk patient for CAD  New and worsening dyspnea  Borderline abnormal  ECG   ECG in office was done today. I reviewed the ECG. No acute findings      Plan:  No follow-ups on file. Discussed  Non invasive cardiac testing will be ordered to further evaluate for any ischemic or structural heart disease as a cause of the patient symptoms. We will proceed with a Stress Cardiolite test and echo soon. Continue risk factor modification and medical management  Thank you for allowing me to participate in the care of your patient. Please don't hesitate to contact me regarding any further issues related to the patient care      Orders Placed:  Orders Placed This Encounter   Procedures    EKG 12 lead     Order Specific Question:   Reason for Exam?     Answer: Other       Medications Prescribed:  No orders of the defined types were placed in this encounter. Discussed use, benefit, and side effects of prescribed medications. All patient questions answered. Pt voicedunderstanding. Instructed to continue current medications, diet and exercise. Continue risk factor modification and medical management. Patient agreed with treatment plan. Follow up as directed.     Electronically signedby Lazaro Barragan MD on 4/22/2021 at 10:52 AM

## 2021-05-10 ENCOUNTER — HOSPITAL ENCOUNTER (OUTPATIENT)
Dept: NON INVASIVE DIAGNOSTICS | Age: 71
Discharge: HOME | End: 2021-05-10
Payer: MEDICARE

## 2021-05-10 DIAGNOSIS — R07.9 CHEST PAIN, UNSPECIFIED TYPE: ICD-10-CM

## 2021-05-10 DIAGNOSIS — Z82.49 FAMILY HISTORY OF EARLY CAD: ICD-10-CM

## 2021-05-10 DIAGNOSIS — E78.01 FAMILIAL HYPERCHOLESTEROLEMIA: ICD-10-CM

## 2021-05-10 LAB
LV EF: 60 %
LVEF MODALITY: NORMAL

## 2021-05-10 PROCEDURE — A9500 TC99M SESTAMIBI: HCPCS | Performed by: NUCLEAR MEDICINE

## 2021-05-10 PROCEDURE — 93018 CV STRESS TEST I&R ONLY: CPT | Performed by: NUCLEAR MEDICINE

## 2021-05-10 PROCEDURE — 93306 TTE W/DOPPLER COMPLETE: CPT

## 2021-05-10 PROCEDURE — 3430000000 HC RX DIAGNOSTIC RADIOPHARMACEUTICAL: Performed by: NUCLEAR MEDICINE

## 2021-05-10 PROCEDURE — 78452 HT MUSCLE IMAGE SPECT MULT: CPT

## 2021-05-10 PROCEDURE — 78452 HT MUSCLE IMAGE SPECT MULT: CPT | Performed by: NUCLEAR MEDICINE

## 2021-05-10 PROCEDURE — 6360000002 HC RX W HCPCS

## 2021-05-10 PROCEDURE — 93017 CV STRESS TEST TRACING ONLY: CPT | Performed by: NUCLEAR MEDICINE

## 2021-05-10 PROCEDURE — 93016 CV STRESS TEST SUPVJ ONLY: CPT | Performed by: NUCLEAR MEDICINE

## 2021-05-10 RX ADMIN — Medication 28.6 MILLICURIE: at 10:39

## 2021-05-10 RX ADMIN — Medication 8.2 MILLICURIE: at 09:20

## 2021-05-14 ENCOUNTER — TELEPHONE (OUTPATIENT)
Dept: CARDIOLOGY CLINIC | Age: 71
End: 2021-05-14

## 2021-05-14 NOTE — TELEPHONE ENCOUNTER
Please see My Chart Message:       Billy Forget \"Shannan Garrido\"  Nat Schwab MD 28 minutes ago (2:20 PM)        Hi Dr Katie Pimentel, . .. I cant ignore the fact that when at hospital  my BP went up to 206 PRIOR to my stress test May 10. I wasnt really nervous about the exam, was fasting, got my nuclear med and after a wait was laid on hard table for 10 minutes with arms over head. When doing this I had pain in back and shoulders caused by physical issues, not unbearable but it was there. I was cold and started to shiver. I started feeling my heart pounding and need to deep breathe. I have this occasionally, even in middle of night, when bending or climbing stairs etc. I have been told I have a hiatal hernia and am sure being overweight and diabetic belly doesnt help. I guess I was probably worried at this point. sometimes my heart pounding takes hours to settle down. When I got to stress lab, I was ASTOUNDED that my BP was 206. In my spot checks I have never seen it this high before but BP will usually be higher when I visit the Dr etc.   Any idea why my BP went up so high? Fear factor, pain, fasting, dehydrated, reaction to nuclear med, all of the above? I have been monitoring my BP and pulse at your suggestion. I left a sheet with my numbers. I see elevations with work and exercise but sometimes even mid nite when I wake up. I always put it off to blood sugars etc,   Cardiolyte stress couldnt be done with my BP high. So guess I need to know what BP is ok to do exercise. I have been doing a walk fit DVD routine. It starts out with warm up, build up, then cycles of high intensity/recovery. I have been doing 30min/2 mile workout. I have heel spurs and achilles tendonitis in both heels and degenerative back issues and sciatica. It limits what i can do. I do bike when weather permits. What parameters should I follow when I exercise. Today after working in home my BP was 160.  I waited a bit to start my exercise. I stopped at high intensity part and my BP was 166/79 and pulse 104.  30 min post exercise my BP  was 139/80 and pulse 79.      Soooo need some guidelines and suggestions.   thanks  Brenda Humphrey [birth year]

## 2021-05-14 NOTE — TELEPHONE ENCOUNTER
If resting BP is below 150 systolci she should be good to exercise  Monitor the BP and keep us posted

## 2021-05-25 ENCOUNTER — TELEPHONE (OUTPATIENT)
Dept: CARDIOLOGY CLINIC | Age: 71
End: 2021-05-25

## 2021-05-25 NOTE — TELEPHONE ENCOUNTER
Elizabeth Mahoney \"Shannan Garrido\"  Carmen Powers MD 11 hours ago (7:23 PM)     TESS Mack wrote that I should take my BP during day for a week and send results. . see attachment

## 2021-06-14 ENCOUNTER — HOSPITAL ENCOUNTER (EMERGENCY)
Age: 71
Discharge: HOME | End: 2021-06-14
Attending: EMERGENCY MEDICINE
Payer: MEDICARE

## 2021-06-14 ENCOUNTER — TELEPHONE (OUTPATIENT)
Dept: CARDIOLOGY CLINIC | Age: 71
End: 2021-06-14

## 2021-06-14 ENCOUNTER — APPOINTMENT (OUTPATIENT)
Dept: GENERAL RADIOLOGY | Age: 71
End: 2021-06-14
Payer: MEDICARE

## 2021-06-14 VITALS
DIASTOLIC BLOOD PRESSURE: 68 MMHG | SYSTOLIC BLOOD PRESSURE: 139 MMHG | WEIGHT: 197 LBS | RESPIRATION RATE: 18 BRPM | OXYGEN SATURATION: 96 % | BODY MASS INDEX: 31.66 KG/M2 | TEMPERATURE: 98.6 F | HEIGHT: 66 IN | HEART RATE: 97 BPM

## 2021-06-14 DIAGNOSIS — R00.2 PALPITATIONS: Primary | ICD-10-CM

## 2021-06-14 DIAGNOSIS — R00.2 HEART PALPITATIONS: Primary | ICD-10-CM

## 2021-06-14 LAB
ALBUMIN SERPL-MCNC: 4.2 G/DL (ref 3.5–5.1)
ALP BLD-CCNC: 96 U/L (ref 38–126)
ALT SERPL-CCNC: 37 U/L (ref 11–66)
ANION GAP SERPL CALCULATED.3IONS-SCNC: 9 MEQ/L (ref 8–16)
AST SERPL-CCNC: 32 U/L (ref 5–40)
BACTERIA: ABNORMAL /HPF
BASOPHILS # BLD: 1.1 %
BASOPHILS ABSOLUTE: 0.1 THOU/MM3 (ref 0–0.1)
BILIRUB SERPL-MCNC: 0.5 MG/DL (ref 0.3–1.2)
BILIRUBIN URINE: NEGATIVE
BLOOD, URINE: NEGATIVE
BUN BLDV-MCNC: 9 MG/DL (ref 7–22)
CALCIUM SERPL-MCNC: 9.6 MG/DL (ref 8.5–10.5)
CASTS 2: ABNORMAL /LPF
CASTS UA: ABNORMAL /LPF
CHARACTER, URINE: CLEAR
CHLORIDE BLD-SCNC: 106 MEQ/L (ref 98–111)
CO2: 28 MEQ/L (ref 23–33)
COLOR: YELLOW
CREAT SERPL-MCNC: 0.8 MG/DL (ref 0.4–1.2)
CRYSTALS, UA: ABNORMAL
D-DIMER QUANTITATIVE: 392 NG/ML FEU (ref 0–500)
EKG ATRIAL RATE: 118 BPM
EKG P AXIS: 35 DEGREES
EKG P-R INTERVAL: 162 MS
EKG Q-T INTERVAL: 306 MS
EKG QRS DURATION: 68 MS
EKG QTC CALCULATION (BAZETT): 428 MS
EKG R AXIS: -3 DEGREES
EKG T AXIS: 111 DEGREES
EKG VENTRICULAR RATE: 118 BPM
EOSINOPHIL # BLD: 0.8 %
EOSINOPHILS ABSOLUTE: 0 THOU/MM3 (ref 0–0.4)
EPITHELIAL CELLS, UA: ABNORMAL /HPF
ERYTHROCYTE [DISTWIDTH] IN BLOOD BY AUTOMATED COUNT: 13.2 % (ref 11.5–14.5)
ERYTHROCYTE [DISTWIDTH] IN BLOOD BY AUTOMATED COUNT: 44 FL (ref 35–45)
GFR SERPL CREATININE-BSD FRML MDRD: 71 ML/MIN/1.73M2
GLUCOSE BLD-MCNC: 107 MG/DL (ref 70–108)
GLUCOSE URINE: NEGATIVE MG/DL
HCT VFR BLD CALC: 38.9 % (ref 37–47)
HEMOGLOBIN: 12.6 GM/DL (ref 12–16)
IMMATURE GRANS (ABS): 0.02 THOU/MM3 (ref 0–0.07)
IMMATURE GRANULOCYTES: 0.3 %
KETONES, URINE: NEGATIVE
LACTIC ACID: 0.9 MMOL/L (ref 0.5–2)
LEUKOCYTE ESTERASE, URINE: ABNORMAL
LYMPHOCYTES # BLD: 20.3 %
LYMPHOCYTES ABSOLUTE: 1.3 THOU/MM3 (ref 1–4.8)
MCH RBC QN AUTO: 29.6 PG (ref 26–33)
MCHC RBC AUTO-ENTMCNC: 32.4 GM/DL (ref 32.2–35.5)
MCV RBC AUTO: 91.3 FL (ref 81–99)
MISCELLANEOUS 2: ABNORMAL
MONOCYTES # BLD: 8 %
MONOCYTES ABSOLUTE: 0.5 THOU/MM3 (ref 0.4–1.3)
NITRITE, URINE: NEGATIVE
NUCLEATED RED BLOOD CELLS: 0 /100 WBC
OSMOLALITY CALCULATION: 284.1 MOSMOL/KG (ref 275–300)
PH UA: 6.5 (ref 5–9)
PLATELET # BLD: 250 THOU/MM3 (ref 130–400)
PMV BLD AUTO: 10.7 FL (ref 9.4–12.4)
POTASSIUM REFLEX MAGNESIUM: 3.8 MEQ/L (ref 3.5–5.2)
PRO-BNP: 132.7 PG/ML (ref 0–900)
PROTEIN UA: NEGATIVE
RBC # BLD: 4.26 MILL/MM3 (ref 4.2–5.4)
RBC URINE: ABNORMAL /HPF
RENAL EPITHELIAL, UA: ABNORMAL
SEG NEUTROPHILS: 69.5 %
SEGMENTED NEUTROPHILS ABSOLUTE COUNT: 4.3 THOU/MM3 (ref 1.8–7.7)
SODIUM BLD-SCNC: 143 MEQ/L (ref 135–145)
SPECIFIC GRAVITY, URINE: 1.01 (ref 1–1.03)
TOTAL PROTEIN: 7.1 G/DL (ref 6.1–8)
TROPONIN T: < 0.01 NG/ML
TROPONIN T: < 0.01 NG/ML
TSH SERPL DL<=0.05 MIU/L-ACNC: 0.82 UIU/ML (ref 0.4–4.2)
UROBILINOGEN, URINE: 0.2 EU/DL (ref 0–1)
WBC # BLD: 6.2 THOU/MM3 (ref 4.8–10.8)
WBC UA: ABNORMAL /HPF
YEAST: ABNORMAL

## 2021-06-14 PROCEDURE — 71046 X-RAY EXAM CHEST 2 VIEWS: CPT

## 2021-06-14 PROCEDURE — 99285 EMERGENCY DEPT VISIT HI MDM: CPT

## 2021-06-14 PROCEDURE — 84443 ASSAY THYROID STIM HORMONE: CPT

## 2021-06-14 PROCEDURE — 80053 COMPREHEN METABOLIC PANEL: CPT

## 2021-06-14 PROCEDURE — 2580000003 HC RX 258: Performed by: EMERGENCY MEDICINE

## 2021-06-14 PROCEDURE — 85379 FIBRIN DEGRADATION QUANT: CPT

## 2021-06-14 PROCEDURE — 83605 ASSAY OF LACTIC ACID: CPT

## 2021-06-14 PROCEDURE — 85025 COMPLETE CBC W/AUTO DIFF WBC: CPT

## 2021-06-14 PROCEDURE — 84484 ASSAY OF TROPONIN QUANT: CPT

## 2021-06-14 PROCEDURE — 36415 COLL VENOUS BLD VENIPUNCTURE: CPT

## 2021-06-14 PROCEDURE — 93010 ELECTROCARDIOGRAM REPORT: CPT | Performed by: INTERNAL MEDICINE

## 2021-06-14 PROCEDURE — 83880 ASSAY OF NATRIURETIC PEPTIDE: CPT

## 2021-06-14 PROCEDURE — 93005 ELECTROCARDIOGRAM TRACING: CPT | Performed by: EMERGENCY MEDICINE

## 2021-06-14 PROCEDURE — 81001 URINALYSIS AUTO W/SCOPE: CPT

## 2021-06-14 RX ORDER — 0.9 % SODIUM CHLORIDE 0.9 %
1000 INTRAVENOUS SOLUTION INTRAVENOUS ONCE
Status: COMPLETED | OUTPATIENT
Start: 2021-06-14 | End: 2021-06-14

## 2021-06-14 RX ADMIN — SODIUM CHLORIDE 1000 ML: 9 INJECTION, SOLUTION INTRAVENOUS at 19:07

## 2021-06-14 ASSESSMENT — ENCOUNTER SYMPTOMS
NAUSEA: 0
SHORTNESS OF BREATH: 0
COUGH: 0
SORE THROAT: 0
RHINORRHEA: 0
DIARRHEA: 0
CONSTIPATION: 0
VOMITING: 0
ABDOMINAL PAIN: 0
BLOOD IN STOOL: 0

## 2021-06-14 NOTE — ED NOTES
To ed for Palpitations and racing heart. Denies Pain at this time. EKG obtained.  at this time.      Hitesh Holt RN  06/14/21 2025

## 2021-06-14 NOTE — ED PROVIDER NOTES
Selin Rose 13 COMPLAINT       Chief Complaint   Patient presents with    Chest Pain    Palpitations       Nurses Notes reviewed and I agree except as noted in the HPI. HISTORY OF PRESENT ILLNESS    Karel Litten is a 72 y.o. pleasant female who presents to the emergency department due to palpitations and heart pounding. Patient states that she has been having what she describes as flip-flops or a bumpity heart for years. She states around May 10 she was to have a stress test.  She states she was given the nuclear medicine and felt as though her heart was pounding and her blood pressure was too high for the test to be completed. She states he had to be given a chemical stress test which was negative. She reports that on Saturday she had an episode where she felt as though her heart was pounding. Today she was shopping at [employer] around noon, states she did not feel stressed and has not had caffeine intake, began to feel as though her heart was pounding. She went home and checked and her heart rate was 111 and was irregular on an sher on her phone. Her blood pressure was 137 at that time. She states the pounding continued, her highest heart rate was 120 and she also reports a blood pressure reading of 190 at home. She called Dr. Ashwini England office but did not receive a return phone call so came out to the emergency department. She states things are calming down now. Estimates she started feeling as though her heart was not pounding around 4:30 PM.  She denies chest pain. She reports she has had some shortness of breath with these episodes. She reports that the heart pounding sensation started last fall and her thyroid medications were changed as initially thought her thyroid may be the cause. She has not worn a Holter or event monitor. Denies lower extremity pain or worse swelling currently.   At times she is thought she may have a little bit of swelling. No fever, chest pain, abdominal pain, nausea, vomiting, diaphoresis cough, shortness of breath currently. She denies any past history of DVT or PE. No recent surgery or immobility. She states after her stress test she checked her blood pressure for a week and her blood pressure readings were never as high as they had been at that time even after exercise. She states her episodes of heart pounding seem to occur randomly, prior to today it most recently happened while she was sitting at a graduation party. She denies a history of hypertension, tobacco use, family history of heart disease or personal known history of heart disease. She does report having diabetes and high cholesterol. No other initial complaints or concerns. REVIEW OF SYSTEMS     Review of Systems   Constitutional: Negative for diaphoresis and fever. HENT: Negative for congestion, rhinorrhea and sore throat. Eyes: Negative for visual disturbance. Respiratory: Negative for cough and shortness of breath. Cardiovascular: Positive for palpitations. Negative for chest pain and leg swelling. Gastrointestinal: Negative for abdominal pain, blood in stool, constipation, diarrhea, nausea and vomiting. Endocrine: Negative for polyuria. Genitourinary: Negative for difficulty urinating and dysuria. Musculoskeletal: Negative for joint swelling. Skin: Negative for rash. Neurological: Negative for seizures, syncope, facial asymmetry, speech difficulty, weakness and headaches. Hematological: Negative for adenopathy. Psychiatric/Behavioral: Negative for confusion. All other systems reviewed and are negative. PAST MEDICAL HISTORY    has a past medical history of Hypothyroidism, Insulin resistance, and Pancreatitis. SURGICAL HISTORY      has a past surgical history that includes Cholecystectomy;  section; Hysterectomy; and Tonsillectomy.     CURRENT MEDICATIONS       Discharge Medication List as of 2021 10:22 PM      CONTINUE these medications which have NOT CHANGED    Details   metFORMIN (GLUCOPHAGE-XR) 500 MG extended release tablet Once daily 3 days a week., Disp-60 tablet, R-2Adjust Sig      !! thyroid (NP THYROID) 90 MG tablet Take 1 tablet by mouth daily , Tuesday, Wednesday, Thursday and Saturday. Patient take Natures Pure Thyroid-not [place] Thyroid, Disp-66 tablet,R-3Normal      !! thyroid (NP THYROID) 60 MG tablet Take 1 tablet by mouth daily Monday and Friday. Natures Pure Thyroid-not [place], Disp-24 tablet,R-3Normal      Multiple Vitamins-Minerals (WOMENS MULTIVITAMIN PO) Take by mouth ChewableHistorical Med      gabapentin (NEURONTIN) 100 MG capsule Take 1 capsule by mouth 3 times daily for 90 days. Intended supply: 90 days, Disp-270 capsule,R-0Normal      atorvastatin (LIPITOR) 20 MG tablet Take 1 tablet by mouth daily, Disp-90 tablet, R-3Normal      ibuprofen (ADVIL;MOTRIN) 600 MG tablet Take 1 tablet by mouth every 6 hours as needed for Pain, Disp-360 tablet,R-3Normal      blood glucose test strips (ASCENSIA AUTODISC VI;ONE TOUCH ULTRA TEST VI) strip Disp-300 each,R-3, PrintTest TID. One Touch Verio test strips Dx code E11.9      aspirin 81 MG chewable tablet Take 81 mg by mouth dailyHistorical Med      clobetasol (TEMOVATE) 0.05 % cream Apply  topically daily. , Topical, Historical Med       !! - Potential duplicate medications found. Please discuss with provider. ALLERGIES     has No Known Allergies. FAMILY HISTORY     She indicated that her mother is . She indicated that her father is . She indicated that her paternal grandmother is . She indicated that her paternal grandfather is . She indicated that her paternal uncle is . family history includes Cancer in her father and mother; Diabetes in her mother; Heart Disease in her father, mother, paternal grandfather, paternal grandmother, and paternal uncle.     SOCIAL HISTORY edema.]  SKIN: [Warm, dry. No jaundice, rash, urticaria, or petechiae]  NEUROLOGIC: [Alert and oriented x 3, GCS 15, normal mentation for age. Moves all four extremities. No gross sensory deficit. Cerebellar function grossly normal.]  PSYCHIATRIC: [Normal mood and affect, thought process is clear and linear]     DIFFERENTIAL DIAGNOSIS:   ?dehydration, ?hyperthyroidism, ? PE, ??acs, ?? chf, ?anemia, and others    DIAGNOSTIC RESULTS     EKG: All EKG's are interpreted by the Emergency Department Physician who either signs or Co-signsthis chart in the absence of a cardiologist.  EKG shows sinus tachycardia with premature atrial complexes rate of 118 bpm, GA interval 162 ms, QRS duration 68 ms,  ms, no ST elevation or depression, my interpretation. RADIOLOGY: non-plain film images(s) such as CT,Ultrasound and MRI are read by the radiologist.    XR CHEST (2 VW)   Final Result      No acute intrathoracic process. **This report has been created using voice recognition software. It may contain minor errors which are inherent in voice recognition technology. **      Final report electronically signed by Dr. Joanna Del Valle on 6/14/2021 4:43 PM        [] Visualized and interpreted by me   [x] Radiologist's Wet Read Report Reviewed   [] Discussed withRadiologist.    LABS:   Labs Reviewed   GLOMERULAR FILTRATION RATE, ESTIMATED - Abnormal; Notable for the following components:       Result Value    Est, Glom Filt Rate 71 (*)     All other components within normal limits   URINE WITH REFLEXED MICRO - Abnormal; Notable for the following components:    Leukocyte Esterase, Urine TRACE (*)     All other components within normal limits   COMPREHENSIVE METABOLIC PANEL W/ REFLEX TO MG FOR LOW K   TROPONIN   CBC WITH AUTO DIFFERENTIAL   BRAIN NATRIURETIC PEPTIDE   D-DIMER, QUANTITATIVE   TSH WITH REFLEX   LACTIC ACID, PLASMA   ANION GAP   OSMOLALITY   TROPONIN   TROPONIN   TROPONIN       EMERGENCY DEPARTMENT COURSE: as possible for a visit         DISCHARGE MEDICATIONS:  Discharge Medication List as of 6/14/2021 10:22 PM          (Please note that portions of this note were completed with a voice recognition program.  Efforts were made to edit the dictations but occasionally words are mis-transcribed.)    Provider:  I personally performed the services described in the documentation, reviewed and edited the documentation which was dictated, and it accurately records my words and actions.     Delfino Duke MD 6/14/21 2:14 AM                Delfino Duke MD  06/15/21 0296       Delfino Duke MD  06/15/21 [id number]

## 2021-06-14 NOTE — ED NOTES
ED nurse-to-nurse bedside report    Chief Complaint   Patient presents with    Chest Pain    Palpitations      LOC: alert and orientated to name, place, date  Vital signs   Vitals:    06/14/21 1616 06/14/21 1619 06/14/21 1809 06/14/21 1908   BP:  (!) 184/75 (!) 179/85 (!) 154/77   Pulse: 120  100 94   Resp: 20  18 18   Temp:  98.6 °F (37 °C)     TempSrc:  Oral     SpO2: 97%  97% 98%   Weight: 197 lb (89.4 kg)      Height:   5' 6\" (1.676 m)       Pain:    Pain Interventions: none  Pain Goal:   Oxygen: No    Current needs required    Telemetry: Yes  LDAs:   Peripheral IV 06/14/21 Left Forearm (Active)   Site Assessment Clean;Dry; Intact 06/14/21 1907   Line Status Normal saline locked 06/14/21 1907   Dressing Status Clean;Dry; Intact 06/14/21 1907     Continuous Infusions:   Mobility: Independent  Bust[place]ante Fall Risk Score: Fall Risk 10/19/2020 5/16/2019   2 or more falls in past year?  yes no   Fall with injury in past year? no no     Fall Interventions: none  Report given to: Terese Bustillo RN  06/14/21 1931

## 2021-06-14 NOTE — ED NOTES
Pt resting in bed. Resp regular. Denies all needs. Famfily at side. Call light in reach.       Didier Tolentino RN  06/14/21 5897

## 2021-06-14 NOTE — ED NOTES
Pt to room 1. Assessment completed. Pt denies any pain, SOB or palpitations at this time. Pt states she is feeling great now. Lab at bedside for draw. Family at side. Call light in reach.       Oliverio Peng RN  06/14/21 3212

## 2021-06-15 ENCOUNTER — TELEPHONE (OUTPATIENT)
Dept: FAMILY MEDICINE CLINIC | Age: 71
End: 2021-06-15

## 2021-06-15 NOTE — ED NOTES
Pt resting in bed, watching tv, with spouse at bedside. Respirations even and unlabored. Pt denies palpitations at this time.       Layla Bush RN  06/14/21 2013

## 2021-06-23 ENCOUNTER — HOSPITAL ENCOUNTER (OUTPATIENT)
Dept: NON INVASIVE DIAGNOSTICS | Age: 71
Discharge: HOME | End: 2021-06-23
Payer: MEDICARE

## 2021-06-23 DIAGNOSIS — R00.2 HEART PALPITATIONS: ICD-10-CM

## 2021-06-23 PROCEDURE — 93246 EXT ECG>7D<15D RECORDING: CPT

## 2021-06-29 ENCOUNTER — TELEPHONE (OUTPATIENT)
Dept: FAMILY MEDICINE CLINIC | Age: 71
End: 2021-06-29

## 2021-06-29 ENCOUNTER — OFFICE VISIT (OUTPATIENT)
Dept: FAMILY MEDICINE CLINIC | Age: 71
End: 2021-06-29
Payer: MEDICARE

## 2021-06-29 VITALS
OXYGEN SATURATION: 97 % | SYSTOLIC BLOOD PRESSURE: 138 MMHG | TEMPERATURE: 97 F | DIASTOLIC BLOOD PRESSURE: 86 MMHG | HEART RATE: 98 BPM | BODY MASS INDEX: 32.09 KG/M2 | WEIGHT: 198.8 LBS

## 2021-06-29 DIAGNOSIS — R00.2 PALPITATIONS: ICD-10-CM

## 2021-06-29 DIAGNOSIS — E03.9 HYPOTHYROIDISM, UNSPECIFIED TYPE: ICD-10-CM

## 2021-06-29 DIAGNOSIS — I10 ESSENTIAL HYPERTENSION: Primary | ICD-10-CM

## 2021-06-29 DIAGNOSIS — E03.9 ACQUIRED HYPOTHYROIDISM: ICD-10-CM

## 2021-06-29 PROCEDURE — 99214 OFFICE O/P EST MOD 30 MIN: CPT | Performed by: FAMILY MEDICINE

## 2021-06-29 RX ORDER — PROPRANOLOL HYDROCHLORIDE 60 MG/1
60 TABLET ORAL 3 TIMES DAILY PRN
Qty: 60 TABLET | Refills: 1 | Status: SHIPPED | OUTPATIENT
Start: 2021-06-29 | End: 2021-07-14

## 2021-06-29 RX ORDER — LEVOTHYROXINE AND LIOTHYRONINE 38; 9 UG/1; UG/1
60 TABLET ORAL DAILY
Qty: 90 TABLET | Refills: 1 | Status: SHIPPED | OUTPATIENT
Start: 2021-06-29 | End: 2021-12-07

## 2021-06-29 RX ORDER — PROPRANOLOL HYDROCHLORIDE 60 MG/1
60 TABLET ORAL 3 TIMES DAILY PRN
Qty: 60 TABLET | Refills: 1 | Status: SHIPPED | OUTPATIENT
Start: 2021-06-29 | End: 2021-06-29 | Stop reason: SDUPTHER

## 2021-06-29 ASSESSMENT — ENCOUNTER SYMPTOMS
ABDOMINAL PAIN: 0
SHORTNESS OF BREATH: 1
NAUSEA: 0
RHINORRHEA: 0
COUGH: 0
SORE THROAT: 0
WHEEZING: 0
CONSTIPATION: 0
DIARRHEA: 0

## 2021-06-29 NOTE — TELEPHONE ENCOUNTER
Pt states you changed her thyroid med today. She is asking that you please send in new script for thyroid medication at the new dose. 90 days at a time [facility] .

## 2021-06-29 NOTE — PROGRESS NOTES
[address] [place] [address]  Dept: [phone]  Loc: [address] [facility] (:  1950) is a 72 y.o. female, here for evaluation of the following chief complaint(s):  Hypothyroidism, Palpitations (Patient states that she  feel her heart has been pounding. She has had a stress test and an Echo. She is till having episodes while resting and on exertion. ), Discuss Labs (Labs done 21), Other (Patient is currently wearing a 14 day holter monitor. ), and Other (Patient states that Dr. Sol Coburn had given her tizanadine in the past and she would like to discuss. )      ASSESSMENT/PLAN:  1. Essential hypertension  -     propranolol (INDERAL) 60 MG tablet; Take 1 tablet by mouth 3 times daily as needed (blood pressure >160/90), Disp-60 tablet, R-1Normal  2. Hypothyroidism, unspecified type  3. Palpitations  inderal for prn  Will lower thyroid dose to 60mcg daily and recheck labs in 6 wks  holter monitor and follow up with cardio    Return in about 6 weeks (around 8/10/2021) for follow up. SUBJECTIVE/OBJECTIVE:  Resents to discuss palpitations. She states that lately she has had episodes where her heart is pounding and she feels dizzy and unwell. She states that her blood pressure has become very elevated during these times and at one point systolic was greater than 200. She is being seen by cardiology but so far EKG and echo have not found any significant findings. She is currently wearing a Holter monitor. She states also that sometimes her blood pressure will be normal with a systolic of 326 but oftentimes it is high as well. The fluctuation scare her. Of note, patient's TSH was just checked and it was normal but lower end of normal.  Patient states that she typically feels better when her TSH is slightly higher.   She is currently taking 60 mcg of her thyroid medication 4 days a week and 90

## 2021-07-09 ENCOUNTER — TELEPHONE (OUTPATIENT)
Dept: CARDIOLOGY CLINIC | Age: 71
End: 2021-07-09

## 2021-07-09 NOTE — TELEPHONE ENCOUNTER
Grabiel Ulloa \"Shannan Garrido\"  Earnestine Armas MD 11 minutes ago (7:50 AM)     [facility]  I had another heart pounding episode. Of course my holter monitor was removed on 7/7. Heart pounding lasted about 2 hours. It started while I was listening to an old movie and baking cookies. So no stress or exercise. I had visited Dr Lana Corado on June 29 and she adjusted my thyroid meds and gave me an \"as needed \" prescription for generic Inderol to use if BP went above 160/90 and pulse above 100. The heart episode started out as flutters and progressed to pounding. When checking,  my BP was 170/91 pulse 108. Took Inderol about 15 minutes after I took BP. I took just 30 mg since I have never taken it before. Within 5 minutes of taking pill, BP was coming down so I doubt the pill was even effective by then. Pressure continued to come down . What the Inderol did do is give me huge amounts of gas and diarrhea. It is better but still at 17 hours after taking inderol, I have lots of stomach gas. .      Just sending this to let Dr Katerina Dominguez know what is up and if there is anything else he wants me to do at this point. I see him july 14th.

## 2021-07-12 NOTE — TELEPHONE ENCOUNTER
Discussed with patient and she states at times her AM blood pressure is 112. She has an appt with Dr. Jamil Lundy on 7/14/2021 and added to appt notes. She has questions about Verapamil and wants to wait to appt to discuss. Her SBP has been 112-140.

## 2021-07-13 ENCOUNTER — TELEPHONE (OUTPATIENT)
Dept: CARDIOLOGY CLINIC | Age: 71
End: 2021-07-13

## 2021-07-13 NOTE — TELEPHONE ENCOUNTER
From:Malia Garrido       Sent:7/13/2021  7:21 AM EDT         To:Gela Pearce MD    Subject:Non-Urgent Medical Question    I have appt with Dr Nitza Pulliam tomorrow. July 14. Holter monitor was taken off july 7 . It looks like monitor arrived at [address] [place] [address] In [place] July 9 at 10 am. Could someone make sure the results are back  so I can discuss with Dr Nitza Pulliam tomorrow? Or call them to see if reading is done. Thanks  Adelina Diehl      Spoke with Ronny Corbin in EKG/Holter lab. She will check in office a let us know.

## 2021-07-14 ENCOUNTER — OFFICE VISIT (OUTPATIENT)
Dept: CARDIOLOGY CLINIC | Age: 71
End: 2021-07-14
Payer: MEDICARE

## 2021-07-14 ENCOUNTER — TELEPHONE (OUTPATIENT)
Dept: CARDIOLOGY CLINIC | Age: 71
End: 2021-07-14

## 2021-07-14 VITALS
WEIGHT: 200.2 LBS | DIASTOLIC BLOOD PRESSURE: 90 MMHG | HEART RATE: 104 BPM | HEIGHT: 66 IN | BODY MASS INDEX: 32.17 KG/M2 | SYSTOLIC BLOOD PRESSURE: 152 MMHG

## 2021-07-14 DIAGNOSIS — I10 ESSENTIAL HYPERTENSION: ICD-10-CM

## 2021-07-14 DIAGNOSIS — E78.01 FAMILIAL HYPERCHOLESTEROLEMIA: ICD-10-CM

## 2021-07-14 DIAGNOSIS — R00.2 PALPITATION: Primary | ICD-10-CM

## 2021-07-14 PROCEDURE — 99214 OFFICE O/P EST MOD 30 MIN: CPT | Performed by: NUCLEAR MEDICINE

## 2021-07-14 RX ORDER — METOPROLOL SUCCINATE 25 MG/1
25 TABLET, EXTENDED RELEASE ORAL DAILY
COMMUNITY
End: 2021-07-14 | Stop reason: SDUPTHER

## 2021-07-14 RX ORDER — METOPROLOL SUCCINATE 25 MG/1
25 TABLET, EXTENDED RELEASE ORAL DAILY
Qty: 90 TABLET | Refills: 1 | Status: SHIPPED | OUTPATIENT
Start: 2021-07-14 | End: 2021-10-21 | Stop reason: SDUPTHER

## 2021-07-14 NOTE — PROCEDURES
[address] [place] [address]                                 EVENT MONITOR    PATIENT NAME: Fabio Segal                   :        1950  MED REC NO:   [id number]                           ROOM:  ACCOUNT NO:   [de-identified]                           ADMIT DATE: 2021  PROVIDER:     Dickson Serrano M.D. CLINICAL HISTORY AND INDICATION:  This is a patient with palpitation. EVENT MONITOR DESCRIPTION:  Event monitor was attached to the patient  between 2021 and 2021. EVENT MONITOR FINDINGS:  Baseline rhythm showed sinus rhythm with short  intermittent episodes of narrow complex tachyarrhythmia, likely atrial  fibrillation of several beats. No sustained AFib was noted. No V-tach  and no pauses. CONCLUSION:  1. Sinus rhythm. 2.  Short nonsustained episodes of atrial fibrillation, rapid  ventricular response at several beats at a time were noted on different  occasions. 3.  Clinical correlation is recommended.         Vesna Garcia M.D.    D: 2021 7:28:56       T: 2021 9:39:19     [email or web address]  Job#: [id number]     Doc#: [id number]    CC:

## 2021-07-14 NOTE — PROGRESS NOTES
[address] [facility] .  [address] [place] [id number]  Dept: [phone]  Dept Fax: [phone]  Loc: [phone]    Visit Date: 2021    Gracia Serrano is a 72 y.o. female who presents todayfor:  Chief Complaint   Patient presents with    3 Month Follow-Up    Check-Up    Results    Palpitations    Hypertension   stress test and echo were okay   Multiple risk factors for many years   Dm for a while  Lately dealing with palpitation   More than usual   Associated HTN   Off of meds now   A fib on event monitor   She is HEIDI score 3    HPI:  HPI  Past Medical History:   Diagnosis Date    Hypothyroidism     Insulin resistance     Pancreatitis     r/t Gall bladder      Past Surgical History:   Procedure Laterality Date     SECTION      CHOLECYSTECTOMY      HYSTERECTOMY      TONSILLECTOMY       Family History   Problem Relation Age of Onset    Diabetes Mother     Heart Disease Mother     Cancer Mother     Heart Disease Father     Cancer Father         bone    Heart Disease Paternal Uncle     Heart Disease Paternal Grandmother     Heart Disease Paternal Grandfather      Social History     Tobacco Use    Smoking status: Never Smoker    Smokeless tobacco: Never Used   Substance Use Topics    Alcohol use: No      Current Outpatient Medications   Medication Sig Dispense Refill    thyroid (NP THYROID) 60 MG tablet Take 1 tablet by mouth daily Natures Pure Thyroid-not [place] 90 tablet 1    metFORMIN (GLUCOPHAGE-XR) 500 MG extended release tablet Once daily 3 days a week.  (Patient taking differently: PRN) 60 tablet 2    Multiple Vitamins-Minerals (WOMENS MULTIVITAMIN PO) Take by mouth Chewable      atorvastatin (LIPITOR) 20 MG tablet Take 1 tablet by mouth daily 90 tablet 3    ibuprofen (ADVIL;MOTRIN) 600 MG tablet Take 1 tablet by mouth every 6 hours as needed for Pain 360 tablet 3    blood glucose test strips (ASCENSIA AUTODISC VI;ONE TOUCH ULTRA TEST VI) strip Test TID. One Touch Verio test strips Dx code E11.9 300 each 3    aspirin 81 MG chewable tablet Take 81 mg by mouth daily      clobetasol (TEMOVATE) 0.05 % cream Apply  topically daily. No current facility-administered medications for this visit. No Known Allergies  Health Maintenance   Topic Date Due    DEXA (modify frequency per FRAX score)  Never done    Annual Wellness Visit (AWV)  Never done    Diabetic microalbuminuria test  10/19/2021 (Originally 12/11/2013)    Pneumococcal 65+ years Vaccine (2 of 2 - PPSV23) 04/14/2023 (Originally 12/4/2019)    Flu vaccine (1) 09/01/2021    Lipid screen  09/09/2021    Diabetic foot exam  10/19/2021    A1C test (Diabetic or Prediabetic)  04/13/2022    Diabetic retinal exam  04/13/2022    TSH testing  06/14/2022    Breast cancer screen  08/28/2022    DTaP/Tdap/Td vaccine (3 - Td or Tdap) 10/23/2028    Colon cancer screen colonoscopy  09/10/2029    Shingles Vaccine  Completed    COVID-19 Vaccine  Completed    Hepatitis C screen  Completed    Hepatitis A vaccine  Aged Out    Hib vaccine  Aged Out    Meningococcal (ACWY) vaccine  Aged Out       Subjective:  Review of Systems  General:   No fever, no chills, some fatigue or weight loss  Pulmonary:    some dyspnea, no wheezing  Cardiac:    Denies recent chest pain,   GI:     No nausea or vomiting, no abdominal pain  Neuro:     No dizziness or light headedness,   Musculoskeletal:  No recent active issues  Extremities:   No edema, no obvious claudication       Objective:  Physical Exam  BP (!) 152/90   Pulse 104   Ht 5' 6\" (1.676 m)   Wt 200 lb 3.2 oz (90.8 kg)   BMI 32.31 kg/m²   General:   Well developed, well nourished  Lungs:   Clear to auscultation  Heart:    Normal S1 S2, Slight murmur.  no rubs, no gallops  Abdomen:   Soft, non tender, no organomegalies, positive bowel sounds  Extremities:   No edema, no cyanosis, good peripheral pulses  Neurological:   Awake, alert, oriented. No obvious focal deficits  Musculoskelatal:  No obvious deformities    Assessment:      Diagnosis Orders   1. Palpitation     2. Essential hypertension     3. Familial hypercholesterolemia     new a fib   HEIDI vasc 3  Intermittent in nature  New onset  No ischemia     Plan:  No follow-ups on file. Discussed  Discussed NOACs at length   Discussed beta blockers  Discussed possible flecainide  Continue risk factor modification and medical management  Thank you for allowing me to participate in the care of your patient. Please don't hesitate to contact me regarding any further issues related to the patient care    Orders Placed:  No orders of the defined types were placed in this encounter. Medications Prescribed:  No orders of the defined types were placed in this encounter. Discussed use, benefit, and side effects of prescribed medications. All patient questions answered. Pt voicedunderstanding. Instructed to continue current medications, diet and exercise. Continue risk factor modification and medical management. Patient agreed with treatment plan. Follow up as directed.     Electronically signedby Travis Cardenas MD on 7/14/2021 at 12:48 PM

## 2021-07-19 ENCOUNTER — TELEPHONE (OUTPATIENT)
Dept: CARDIOLOGY CLINIC | Age: 71
End: 2021-07-19

## 2021-07-19 NOTE — TELEPHONE ENCOUNTER
Cameron Cardenas not to take motrin on regular basis while on eliquis otherwise higher risk of bleeding

## 2021-07-19 NOTE — TELEPHONE ENCOUNTER
Just a couple of questions about eliquist. Have not started taking it yet. In recent past have had small retinal hemorrhage in my good eye. It resolved without treatment. Do you see this type of side effect with eliquist?      I take motrin 600 mg for degenerative non surgical option back pain. Can I continue with this while on eloquist? I take it occasionally say maybe 5-6 doses a week.

## 2021-07-20 NOTE — TELEPHONE ENCOUNTER
Patricia David \"Shannan Garrido\"  Carr MD Denise Just now (10:31 AM)       Was able to get eye info and risks from my optometrist.  I have started Eliquis and will see how it goes.  Thanks for your help  Soumya Patrick

## 2021-07-26 ENCOUNTER — PATIENT MESSAGE (OUTPATIENT)
Dept: FAMILY MEDICINE CLINIC | Age: 71
End: 2021-07-26

## 2021-07-26 DIAGNOSIS — I10 ESSENTIAL HYPERTENSION: ICD-10-CM

## 2021-07-26 DIAGNOSIS — E11.65 TYPE 2 DIABETES MELLITUS WITH HYPERGLYCEMIA, WITHOUT LONG-TERM CURRENT USE OF INSULIN (HCC): ICD-10-CM

## 2021-07-26 DIAGNOSIS — E03.9 ACQUIRED HYPOTHYROIDISM: Primary | ICD-10-CM

## 2021-07-26 NOTE — TELEPHONE ENCOUNTER
From: Yesica Romero  To: Mirella Abbasi MD  Sent: 7/26/2021 11:22 AM EDT  Subject: Prescription Question    just need lab work orders. .     I am coming in for my wellness visit on Aug 19. Dr Tim Raya reduced my thyroid dose june 29 and I need to recheck my Thyroid blood levels. I plan to do this on Aug 16th at [facility] in [place]. I just called to check if [facility] has an order. I was told by Cleo King june 17 when he called that he would put order in. [facility] doesnt have an order. .. so need an order put in. And. .. while I am getting stuck I am wondering if I can or should get my yearly blood work done. Those I have had in past are-     ie      CBC   lipid panel (elevated chol)   hepatic ( on chol meds)   A1c (diabetic)   metabolic panel    PT to see what med is doing. ( now on Eliquis since July 20)     anything else needed? Need thyroid level ordered. Update- Visited Dr Khadra Parker on July 14. Although i did not think I had any events while holter was on, it evidently showed short prob A-fib episodes. Dr Khadra Parker wanted me on Eliquis since I am female, 65+, and diabetic. He also put me on Tropol SR. He said huge chance the thyroid levels may have brought it on but the A fib will always be an underlying issue.      thanks    Marlene Cr

## 2021-08-12 ENCOUNTER — HOSPITAL ENCOUNTER (OUTPATIENT)
Dept: MAMMOGRAPHY | Age: 71
Discharge: HOME | End: 2021-08-12
Payer: MEDICARE

## 2021-08-12 ENCOUNTER — NURSE ONLY (OUTPATIENT)
Dept: LAB | Age: 71
End: 2021-08-12

## 2021-08-12 DIAGNOSIS — Z12.39 SCREENING BREAST EXAMINATION: ICD-10-CM

## 2021-08-12 DIAGNOSIS — E11.65 TYPE 2 DIABETES MELLITUS WITH HYPERGLYCEMIA, WITHOUT LONG-TERM CURRENT USE OF INSULIN (HCC): ICD-10-CM

## 2021-08-12 DIAGNOSIS — Z83.79 FAMILY HISTORY OF CELIAC DISEASE: ICD-10-CM

## 2021-08-12 DIAGNOSIS — E03.9 ACQUIRED HYPOTHYROIDISM: ICD-10-CM

## 2021-08-12 LAB
ALBUMIN SERPL-MCNC: 4.2 G/DL (ref 3.5–5.1)
ALP BLD-CCNC: 107 U/L (ref 38–126)
ALT SERPL-CCNC: 43 U/L (ref 11–66)
ANION GAP SERPL CALCULATED.3IONS-SCNC: 9 MEQ/L (ref 8–16)
AST SERPL-CCNC: 31 U/L (ref 5–40)
BASOPHILS # BLD: 1.8 %
BASOPHILS ABSOLUTE: 0.1 THOU/MM3 (ref 0–0.1)
BILIRUB SERPL-MCNC: 0.9 MG/DL (ref 0.3–1.2)
BUN BLDV-MCNC: 13 MG/DL (ref 7–22)
CALCIUM SERPL-MCNC: 9.2 MG/DL (ref 8.5–10.5)
CHLORIDE BLD-SCNC: 104 MEQ/L (ref 98–111)
CHOLESTEROL, TOTAL: 135 MG/DL (ref 100–199)
CO2: 29 MEQ/L (ref 23–33)
CREAT SERPL-MCNC: 0.8 MG/DL (ref 0.4–1.2)
EOSINOPHIL # BLD: 2.9 %
EOSINOPHILS ABSOLUTE: 0.1 THOU/MM3 (ref 0–0.4)
ERYTHROCYTE [DISTWIDTH] IN BLOOD BY AUTOMATED COUNT: 13.2 % (ref 11.5–14.5)
ERYTHROCYTE [DISTWIDTH] IN BLOOD BY AUTOMATED COUNT: 44.2 FL (ref 35–45)
ESTIMATED AVERAGE GLUCOSE: 126 MG/DL (ref 70–126)
GFR SERPL CREATININE-BSD FRML MDRD: 71 ML/MIN/1.73M2
GLUCOSE BLD-MCNC: 120 MG/DL (ref 70–108)
HBA1C MFR BLD: 6.2 % (ref 4.4–6.4)
HCT VFR BLD CALC: 43.3 % (ref 37–47)
HDLC SERPL-MCNC: 42 MG/DL
HEMOGLOBIN: 13.7 GM/DL (ref 12–16)
IMMATURE GRANS (ABS): 0.01 THOU/MM3 (ref 0–0.07)
IMMATURE GRANULOCYTES %: 0.2 %
LDL CHOLESTEROL CALCULATED: 73 MG/DL
LYMPHOCYTES # BLD: 25.6 %
LYMPHOCYTES ABSOLUTE: 1.3 THOU/MM3 (ref 1–4.8)
MCH RBC QN AUTO: 28.8 PG (ref 26–33)
MCHC RBC AUTO-ENTMCNC: 31.6 GM/DL (ref 32.2–35.5)
MCV RBC AUTO: 91 FL (ref 81–99)
MONOCYTES # BLD: 9.2 %
MONOCYTES ABSOLUTE: 0.5 THOU/MM3 (ref 0.4–1.3)
NUCLEATED RED BLOOD CELLS: 0 /100 WBC
PLATELET # BLD: 230 THOU/MM3 (ref 130–400)
PMV BLD AUTO: 11 FL (ref 9.4–12.4)
POTASSIUM SERPL-SCNC: 4.1 MEQ/L (ref 3.5–5.2)
RBC # BLD: 4.76 MILL/MM3 (ref 4.2–5.4)
SEG NEUTROPHILS: 60.3 %
SEGMENTED NEUTROPHILS ABSOLUTE COUNT: 3 THOU/MM3 (ref 1.8–7.7)
SODIUM BLD-SCNC: 142 MEQ/L (ref 135–145)
T4 FREE: 0.85 NG/DL (ref 0.93–1.76)
TOTAL PROTEIN: 6.8 G/DL (ref 6.1–8)
TRIGL SERPL-MCNC: 100 MG/DL (ref 0–199)
TSH SERPL DL<=0.05 MIU/L-ACNC: 4.27 UIU/ML (ref 0.4–4.2)
WBC # BLD: 4.9 THOU/MM3 (ref 4.8–10.8)

## 2021-08-12 PROCEDURE — 77063 BREAST TOMOSYNTHESIS BI: CPT

## 2021-08-15 LAB — CELIAC SEROLOGY: NORMAL

## 2021-08-18 ENCOUNTER — HOSPITAL ENCOUNTER (OUTPATIENT)
Dept: WOMENS IMAGING | Age: 71
Discharge: HOME | End: 2021-08-18
Payer: MEDICARE

## 2021-08-18 DIAGNOSIS — R92.2 BREAST DENSITY: ICD-10-CM

## 2021-08-18 PROCEDURE — 76642 ULTRASOUND BREAST LIMITED: CPT

## 2021-08-18 PROCEDURE — G0279 TOMOSYNTHESIS, MAMMO: HCPCS

## 2021-08-19 ENCOUNTER — PATIENT MESSAGE (OUTPATIENT)
Dept: FAMILY MEDICINE CLINIC | Age: 71
End: 2021-08-19

## 2021-08-19 ENCOUNTER — OFFICE VISIT (OUTPATIENT)
Dept: FAMILY MEDICINE CLINIC | Age: 71
End: 2021-08-19
Payer: MEDICARE

## 2021-08-19 VITALS
OXYGEN SATURATION: 97 % | DIASTOLIC BLOOD PRESSURE: 64 MMHG | RESPIRATION RATE: 20 BRPM | HEART RATE: 72 BPM | HEIGHT: 66 IN | WEIGHT: 204.2 LBS | BODY MASS INDEX: 32.82 KG/M2 | SYSTOLIC BLOOD PRESSURE: 122 MMHG

## 2021-08-19 DIAGNOSIS — E11.65 TYPE 2 DIABETES MELLITUS WITH HYPERGLYCEMIA, WITHOUT LONG-TERM CURRENT USE OF INSULIN (HCC): ICD-10-CM

## 2021-08-19 DIAGNOSIS — Z00.00 ROUTINE GENERAL MEDICAL EXAMINATION AT A HEALTH CARE FACILITY: Primary | ICD-10-CM

## 2021-08-19 DIAGNOSIS — R20.2 NUMBNESS AND TINGLING OF BOTH FEET: ICD-10-CM

## 2021-08-19 DIAGNOSIS — R20.0 NUMBNESS AND TINGLING OF BOTH FEET: ICD-10-CM

## 2021-08-19 PROCEDURE — G0439 PPPS, SUBSEQ VISIT: HCPCS | Performed by: FAMILY MEDICINE

## 2021-08-19 RX ORDER — ATORVASTATIN CALCIUM 20 MG/1
20 TABLET, FILM COATED ORAL DAILY
Qty: 90 TABLET | Refills: 3 | Status: SHIPPED | OUTPATIENT
Start: 2021-08-19 | End: 2022-09-11

## 2021-08-19 RX ORDER — GABAPENTIN 100 MG/1
100 CAPSULE ORAL NIGHTLY
Qty: 90 CAPSULE | Refills: 0 | Status: SHIPPED | OUTPATIENT
Start: 2021-08-19 | End: 2022-04-25

## 2021-08-19 ASSESSMENT — LIFESTYLE VARIABLES: HOW OFTEN DO YOU HAVE A DRINK CONTAINING ALCOHOL: 0

## 2021-08-19 ASSESSMENT — PATIENT HEALTH QUESTIONNAIRE - PHQ9
SUM OF ALL RESPONSES TO PHQ QUESTIONS 1-9: 0
SUM OF ALL RESPONSES TO PHQ9 QUESTIONS 1 & 2: 0
SUM OF ALL RESPONSES TO PHQ QUESTIONS 1-9: 0
2. FEELING DOWN, DEPRESSED OR HOPELESS: 0
1. LITTLE INTEREST OR PLEASURE IN DOING THINGS: 0
SUM OF ALL RESPONSES TO PHQ QUESTIONS 1-9: 0

## 2021-08-19 NOTE — PROGRESS NOTES
Medicare Annual Wellness Visit  Name: Ian Garcia Date: 2021   MRN: [id number] Sex: Female   Age: 72 y.o. Ethnicity: Non- / Non    : 1950 Race: White (non-)      Luisa Awan is here for Medicare AWV    Screenings for behavioral, psychosocial and functional/safety risks, and cognitive dysfunction are all negative except as indicated below. These results, as well as other patient data from the [address] form, are documented in Flowsheets linked to this Encounter. No Known Allergies    Prior to Visit Medications    Medication Sig Taking? Authorizing Provider   atorvastatin (LIPITOR) 20 MG tablet Take 1 tablet by mouth daily Yes Megha Berkowitz MD   blood glucose test strips (ASCENSIA AUTODISC VI;ONE TOUCH ULTRA TEST VI) strip Test daily. One Touch Verio test strips Dx code E11.9 Yes Megha Berkowitz MD   gabapentin (NEURONTIN) 100 MG capsule Take 1 capsule by mouth nightly for 90 days. Intended supply: 90 days Yes Megha Berkowitz MD   apixaban (ELIQUIS) 5 MG TABS tablet Take 1 tablet by mouth 2 times daily Yes Casi Apodaca MD   metoprolol succinate (TOPROL XL) 25 MG extended release tablet Take 1 tablet by mouth daily Yes Casi Apodaca MD   thyroid (NP THYROID) 60 MG tablet Take 1 tablet by mouth daily Natures Pure Thyroid-not [place] Yes Megha Berkowitz MD   Multiple Vitamins-Minerals (WOMENS MULTIVITAMIN PO) Take by mouth Chewable Yes Historical Provider, MD   clobetasol (TEMOVATE) 0.05 % cream Apply  topically daily.    Yes Historical Provider, MD       Past Medical History:   Diagnosis Date    Hypothyroidism     Insulin resistance     Pancreatitis     r/t Gall bladder       Past Surgical History:   Procedure Laterality Date    BREAST BIOPSY Left     benign     SECTION      CHOLECYSTECTOMY      HYSTERECTOMY  1985    OVARY REMOVAL Bilateral     ME BX OF BREAST, NEEDLE CORE, IMAGE GUIDE Left     benign    TONSILLECTOMY         Family History   Problem Relation Age of Onset    Diabetes Mother     Heart Disease Mother     Cancer Mother    Jim Granados Breast Cancer Mother 79    Heart Disease Father     Cancer Father         bone    Heart Disease Paternal Uncle     Heart Disease Paternal Grandmother     Heart Disease Paternal Grandfather        CareTeam (Including outside providers/suppliers regularly involved in providing care):   Patient Care Team:  Sanket Hazel MD as PCP - General (Family Medicine)  Sanket Hazel MD as PCP - [facility] Empaneled Provider    Wt Readings from Last 3 Encounters:   08/19/21 204 lb 3.2 oz (92.6 kg)   07/14/21 200 lb 3.2 oz (90.8 kg)   06/29/21 198 lb 12.8 oz (90.2 kg)     Vitals:    08/19/21 0904   BP: 122/64   Pulse: 72   Resp: 20   SpO2: 97%   Weight: 204 lb 3.2 oz (92.6 kg)   Height: 5' 6\" (1.676 m)     Body mass index is 32.96 kg/m². Based upon direct observation of the patient, evaluation of cognition reveals recent and remote memory intact. Pulmonary/Chest: clear to auscultation bilaterally- no wheezes, rales or rhonchi, normal air movement, no respiratory distress  Cardiovascular: normal rate, normal S1 and S2, no gallops, intact distal pulses and no carotid bruits    Patient's complete Health Risk Assessment and screening values have been reviewed and are found in Flowsheets. The following problems were reviewed today and where indicated follow up appointments were made and/or referrals ordered. Positive Risk Factor Screenings with Interventions:          General Health and ACP:  General  In general, how would you say your health is?: Good  In the past 7 days, have you experienced any of the following?  New or Increased Pain, New or Increased Fatigue, Loneliness, Social Isolation, Stress or Anger?: None of These  Do you get the social and emotional support that you need?: Yes  Do you have a Living Will?: Yes  Advance Directives     Power of  Living Will ACP-Advance Vaccine  Completed    Hepatitis C screen  Completed    Hepatitis A vaccine  Aged Out    Hib vaccine  Aged Out    Meningococcal (ACWY) vaccine  Aged Out     Recommendations for [employer] Due: see orders and patient instructions/AVS.  . Recommended screening schedule for the next 5-10 years is provided to the patient in written form: see Patient Instructions/AVS.    Marcia Randle was seen today for medicare aw. Diagnoses and all orders for this visit:    Routine general medical examination at a health care facility    Type 2 diabetes mellitus with hyperglycemia, without long-term current use of insulin (HCC)  -     atorvastatin (LIPITOR) 20 MG tablet; Take 1 tablet by mouth daily  -     blood glucose test strips (ASCENSIA AUTODISC VI;ONE TOUCH ULTRA TEST VI) strip; Test daily. One Touch Verio test strips Dx code E11.9    Numbness and tingling of both feet  -     gabapentin (NEURONTIN) 100 MG capsule; Take 1 capsule by mouth nightly for 90 days. Intended supply: 90 days            Cont thyroid meds for now.   Consider increasing to 90mcg once weekly if fatigue persist.     Electronically signed by Elbert Hurtado MD on 8/19/2021 at 10:38 AM

## 2021-08-19 NOTE — PATIENT INSTRUCTIONS
family can't agree on what should be in your living will. · You can change your living will at any time. Some people find that their wishes about end-of-life care change as their health changes. If you make big changes to your living will, complete a new form. · If you move to another state, make sure that your living will is legal in the state where you now live. In most cases, doctors will respect your wishes even if you have a form from a different state. · You might use a universal form that has been approved by many states. This kind of form can sometimes be filled out and stored online. Your digital copy will then be available wherever you have a connection to the internet. The doctors and nurses who need to treat you can find it right away. · Your state may offer an online registry. This is another place where you can store your living will online. · It's a good idea to get your living will notarized. This means using a person called a  to watch two people sign, or witness, your living will. What should you know when you create a living will? Here are some questions to ask yourself as you make your living will:  · Do you know enough about life support methods that might be used? If not, talk to your doctor so you know what might be done if you can't breathe on your own, your heart stops, or you can't swallow. · What things would you still want to be able to do after you receive life-support methods? Would you want to be able to walk? To speak? To eat on your own? To live without the help of machines? · Do you want certain Lutheran practices performed if you become very ill? · If you have a choice, where do you want to be cared for? In your home? At a hospital or nursing home? · If you have a choice at the end of your life, where would you prefer to die? At home? In a hospital or nursing home? Somewhere else? · Would you prefer to be buried or cremated?   · Do you want your organs to balanced diet by making a few small changes. Follow-up care is a key part of your treatment and safety. Be sure to make and go to all appointments, and call your doctor if you are having problems. It's also a good idea to know your test results and keep a list of the medicines you take. How can you care for yourself at home? Look at what you eat  · Keep a food diary for a week or two and record everything you eat or drink. Track the number of servings you eat from each food group. · For a balanced diet every day, eat a variety of:  ? 6 or more ounce-equivalents of grains, such as cereals, breads, crackers, rice, or pasta, every day. An ounce-equivalent is 1 slice of bread, 1 cup of ready-to-eat cereal, or ½ cup of cooked rice, cooked pasta, or cooked cereal.  ? 2½ cups of vegetables, especially:  § Dark-green vegetables such as broccoli and spinach. § Orange vegetables such as carrots and sweet potatoes. § Dry beans (such as de leon and kidney beans) and peas (such as lentils). ? 2 cups of fresh, frozen, or canned fruit. A small apple or 1 banana or orange equals 1 cup. ? 3 cups of nonfat or low-fat milk, yogurt, or other milk products. ? 5½ ounces of meat and beans, such as chicken, fish, lean meat, beans, nuts, and seeds. One egg, 1 tablespoon of peanut butter, ½ ounce nuts or seeds, or ¼ cup of cooked beans equals 1 ounce of meat. · Learn how to read food labels for serving sizes and ingredients. Fast-food and convenience-food meals often contain few or no fruits or vegetables. Make sure you eat some fruits and vegetables to make the meal more nutritious. · Look at your food diary. For each food group, add up what you have eaten and then divide the total by the number of days. This will give you an idea of how much you are eating from each food group. See if you can find some ways to change your diet to make it more healthy. Start small  · Do not try to make dramatic changes to your diet all at once. You might feel that you are missing out on your favorite foods and then be more likely to fail. · Start slowly, and gradually change your habits. Try some of the following:  ? Use whole wheat bread instead of white bread. ? Use nonfat or low-fat milk instead of whole milk. ? Eat brown rice instead of white rice, and eat whole wheat pasta instead of white-flour pasta. ? Try low-fat cheeses and low-fat yogurt. ? Add more fruits and vegetables to meals and have them for snacks. ? Add lettuce, tomato, cucumber, and onion to sandwiches. ? Add fruit to yogurt and cereal.  Enjoy food  · You can still eat your favorite foods. You just may need to eat less of them. If your favorite foods are high in fat, salt, and sugar, limit how often you eat them, but do not cut them out entirely. · Eat a wide variety of foods. Make healthy choices when eating out  · The type of restaurant you choose can help you make healthy choices. Even fast-food chains are now offering more low-fat or healthier choices on the menu. · Choose smaller portions, or take half of your meal home. · When eating out, try:  ? A veggie pizza with a whole wheat crust or grilled chicken (instead of sausage or pepperoni). ? Pasta with roasted vegetables, grilled chicken, or marinara sauce instead of cream sauce. ? A vegetable wrap or grilled chicken wrap. ? Broiled or poached food instead of fried or breaded items. Make healthy choices easy  · Buy packaged, prewashed, ready-to-eat fresh vegetables and fruits, such as baby carrots, salad mixes, and chopped or shredded broccoli and cauliflower. · Buy packaged, presliced fruits, such as melon or pineapple. · Choose 100% fruit or vegetable juice instead of soda. Limit juice intake to 4 to 6 oz (½ to ¾ cup) a day. · Blend low-fat yogurt, fruit juice, and canned or frozen fruit to make a smoothie for breakfast or a snack. Where can you learn more? Go to https://zach.health-partners. org and sign in to your [employer] account. Enter U349 in the [facility] box to learn more about \"Eating Healthy Foods: Care Instructions. \"     If you do not have an account, please click on the \"Sign Up Now\" link. Current as of: December 17, 2020               Content Version: 12.9  © 9492-9860 Healthwise, [employer]. Care instructions adapted under license by [facility] ([facility]). If you have questions about a medical condition or this instruction, always ask your healthcare professional. [place] [address] any warranty or liability for your use of this information. Personalized Preventive Plan for [facility] - 8/19/2021  Medicare offers a range of preventive health benefits. Some of the tests and screenings are paid in full while other may be subject to a deductible, co-insurance, and/or copay. Some of these benefits include a comprehensive review of your medical history including lifestyle, illnesses that may run in your family, and various assessments and screenings as appropriate. After reviewing your medical record and screening and assessments performed today your provider may have ordered immunizations, labs, imaging, and/or referrals for you. A list of these orders (if applicable) as well as your Preventive Care list are included within your After Visit Summary for your review. Other Preventive Recommendations:    · A preventive eye exam performed by an eye specialist is recommended every 1-2 years to screen for glaucoma; cataracts, macular degeneration, and other eye disorders. · A preventive dental visit is recommended every 6 months. · Try to get at least 150 minutes of exercise per week or 10,000 steps per day on a pedometer . · Order or download the FREE \"Exercise & Physical Activity: Your Everyday Guide\" from [employer]. Call 1-340.387.3736 or search [employer] online.   · You need 6946-4858 mg of calcium and 2556-5571 IU of vitamin D per day. It is possible to meet your calcium requirement with diet alone, but a vitamin D supplement is usually necessary to meet this goal.  · When exposed to the sun, use a sunscreen that protects against both UVA and UVB radiation with an SPF of 30 or greater. Reapply every 2 to 3 hours or after sweating, drying off with a towel, or swimming. · Always wear a seat belt when traveling in a car. Always wear a helmet when riding a bicycle or motorcycle.

## 2021-08-19 NOTE — PROGRESS NOTES
Pt reports some lack of motivation every once in awhile (2-3 times over past 6 weeks). Pt reports aches and pains, but unable to take Motrin, is wondering what she can take while on blood thinner. Tylenol has helped some but has not taken the pain away. Primarily arthropathy of most joints. Biking has helped stretch out her back. Reports some heart 'flutters' but not the 'heart pounding' since July 8th. Pt denies fatigue, dry skin, temperature intolerance, constipation, diarrhea    Q's about what medications she can take for cold and sinus while on Eliquis, abx and how to manage pain, can she go to a chiropractor.

## 2021-08-19 NOTE — TELEPHONE ENCOUNTER
From: Akira Hidden  To: Abhi Aguilera MD  Sent: 8/19/2021 12:33 PM EDT  Subject: Prescription Question    Test Strip order was sent to [facility]. This is an error as we have no coverage for it thru them. Test Strip order for One Touch Verio needs to be sent to   [facility]  [phone]    thank you.

## 2021-10-21 ENCOUNTER — OFFICE VISIT (OUTPATIENT)
Dept: CARDIOLOGY CLINIC | Age: 71
End: 2021-10-21
Payer: MEDICARE

## 2021-10-21 VITALS
BODY MASS INDEX: 32.78 KG/M2 | HEART RATE: 96 BPM | HEIGHT: 66 IN | SYSTOLIC BLOOD PRESSURE: 172 MMHG | DIASTOLIC BLOOD PRESSURE: 98 MMHG | WEIGHT: 204 LBS

## 2021-10-21 DIAGNOSIS — I48.0 PAROXYSMAL ATRIAL FIBRILLATION (HCC): Primary | ICD-10-CM

## 2021-10-21 DIAGNOSIS — E78.01 FAMILIAL HYPERCHOLESTEROLEMIA: ICD-10-CM

## 2021-10-21 DIAGNOSIS — I10 PRIMARY HYPERTENSION: ICD-10-CM

## 2021-10-21 PROCEDURE — 99214 OFFICE O/P EST MOD 30 MIN: CPT | Performed by: NUCLEAR MEDICINE

## 2021-10-21 RX ORDER — METOPROLOL SUCCINATE 25 MG/1
25 TABLET, EXTENDED RELEASE ORAL DAILY
Qty: 90 TABLET | Refills: 3 | Status: SHIPPED | OUTPATIENT
Start: 2021-10-21 | End: 2021-10-22 | Stop reason: DRUGHIGH

## 2021-10-21 NOTE — PROGRESS NOTES
[facility] [address] [place] [address].  [address]  [facility] [address]  Dept: [phone]  Dept Fax: [phone]  Loc: [phone]    Visit Date: 10/21/2021    Daisy Carrera is a 80 y.o. female who presents todayfor:  Chief Complaint   Patient presents with    Check-Up    Atrial Fibrillation    Hypertension    Hyperlipidemia     Known intermittent A fib   HEIDI vasc 3  On NOACs  Some intermittent A fib   Worse recently   Had it for several hours  Now risk for CAD  No known CAD  Bp is high at times      HPI:  HPI  Past Medical History:   Diagnosis Date    Hypothyroidism     Insulin resistance     Pancreatitis     r/t Gall bladder      Past Surgical History:   Procedure Laterality Date    BREAST BIOPSY Left     benign     SECTION      CHOLECYSTECTOMY      HYSTERECTOMY  1985    OVARY REMOVAL Bilateral     GA BX OF BREAST, NEEDLE CORE, IMAGE GUIDE Left     benign    TONSILLECTOMY       Family History   Problem Relation Age of Onset    Diabetes Mother     Heart Disease Mother     Cancer Mother     Breast Cancer Mother 79    Heart Disease Father     Cancer Father         bone    Heart Disease Paternal Uncle     Heart Disease Paternal Grandmother     Heart Disease Paternal Grandfather      Social History     Tobacco Use    Smoking status: Never Smoker    Smokeless tobacco: Never Used   Substance Use Topics    Alcohol use: No      Current Outpatient Medications   Medication Sig Dispense Refill    atorvastatin (LIPITOR) 20 MG tablet Take 1 tablet by mouth daily 90 tablet 3    gabapentin (NEURONTIN) 100 MG capsule Take 1 capsule by mouth nightly for 90 days. Intended supply: 90 days 90 capsule 0    blood glucose test strips (ASCENSIA AUTODISC VI;ONE TOUCH ULTRA TEST VI) strip Test daily.    One Touch Verio test strips Dx code E11.9 100 each 3    apixaban (ELIQUIS) 5 MG TABS tablet Take 1 tablet by mouth 2 times daily 180 tablet 1    metoprolol succinate (TOPROL XL) 25 MG extended release tablet Take 1 tablet by mouth daily 90 tablet 1    thyroid (NP THYROID) 60 MG tablet Take 1 tablet by mouth daily Natures Pure Thyroid-not [place] 90 tablet 1    Multiple Vitamins-Minerals (WOMENS MULTIVITAMIN PO) Take by mouth Chewable      clobetasol (TEMOVATE) 0.05 % cream Apply  topically daily. No current facility-administered medications for this visit. No Known Allergies  Health Maintenance   Topic Date Due    DEXA (modify frequency per FRAX score)  Never done    Diabetic microalbuminuria test  12/11/2013    Diabetic foot exam  10/19/2021    Pneumococcal 65+ years Vaccine (2 of 2 - PPSV23) 04/14/2023 (Originally 12/4/2019)    A1C test (Diabetic or Prediabetic)  08/12/2022    Lipid screen  08/12/2022    TSH testing  08/12/2022    Breast cancer screen  08/18/2022    Annual Wellness Visit (AWV)  08/20/2022    Diabetic retinal exam  10/20/2022    DTaP/Tdap/Td vaccine (3 - Td or Tdap) 10/23/2028    Colon cancer screen colonoscopy  09/10/2029    Flu vaccine  Completed    Shingles Vaccine  Completed    COVID-19 Vaccine  Completed    Hepatitis C screen  Completed    Hepatitis A vaccine  Aged Out    Hib vaccine  Aged Out    Meningococcal (ACWY) vaccine  Aged Out       Subjective:  Review of Systems  General:   No fever, no chills, some fatigue or weight loss  Pulmonary:    some dyspnea, no wheezing  Cardiac:    Denies recent chest pain,   GI:     No nausea or vomiting, no abdominal pain  Neuro:    No dizziness or light headedness,   Musculoskeletal:  No recent active issues  Extremities:   No edema, no obvious claudication       Objective:  Physical Exam  BP (!) 172/98   Pulse 96   Ht 5' 6\" (1.676 m)   Wt 204 lb (92.5 kg)   BMI 32.93 kg/m²   General:   Well developed, well nourished  Lungs:   Clear to auscultation  Heart:    Normal S1 S2, Slight murmur.  no rubs, no gallops  Abdomen:   Soft, non tender, no organomegalies, positive bowel sounds  Extremities:   No edema, no cyanosis, good peripheral pulses  Neurological:   Awake, alert, oriented. No obvious focal deficits  Musculoskelatal:  No obvious deformities    Assessment:      Diagnosis Orders   1. Paroxysmal atrial fibrillation (HCC)     2. Primary hypertension     3. Familial hypercholesterolemia     as above  Intermittent A fib   Higher BP at times  Some weight gain       Plan:  No follow-ups on file. As above  Consider higher dose metoprolol   She prefers to wait for now   Continue risk factor modification and medical management  Thank you for allowing me to participate in the care of your patient. Please don't hesitate to contact me regarding any further issues related to the patient care    Orders Placed:  No orders of the defined types were placed in this encounter. Medications Prescribed:  No orders of the defined types were placed in this encounter. Discussed use, benefit, and side effects of prescribed medications. All patient questions answered. Pt voicedunderstanding. Instructed to continue current medications, diet and exercise. Continue risk factor modification and medical management. Patient agreed with treatment plan. Follow up as directed.     Electronically signedby Antony Blount MD on 10/21/2021 at 11:03 AM

## 2021-10-22 RX ORDER — METOPROLOL SUCCINATE 25 MG/1
25 TABLET, EXTENDED RELEASE ORAL 2 TIMES DAILY
COMMUNITY
End: 2021-10-22 | Stop reason: SDUPTHER

## 2021-10-22 RX ORDER — METOPROLOL SUCCINATE 25 MG/1
25 TABLET, EXTENDED RELEASE ORAL 2 TIMES DAILY
Qty: 180 TABLET | Refills: 0 | Status: SHIPPED | OUTPATIENT
Start: 2021-10-22 | End: 2021-12-06 | Stop reason: SDUPTHER

## 2021-10-22 NOTE — TELEPHONE ENCOUNTER
Hi Dr Rober Walker,  At my visit yesterday, you mentioned upping my dose of metoprolol 25 mg ER and/or  also being able to take an extra tablet or two if I have an event. After talking about it, it was agreed not to up the dose at this time. I am rethinking that now. If I do have events and need to take the extra dose, I will not have enough medication to take as you suggested. I have to order meds thru mail so not as easy as running to pharmacy quick. Soooo. what if you change the order to     METOPROLOL 25 mg ER TWO TIMES A DAY. This would give me enough meds if i need for an event  or to take 2 tabs (50mg) daily to see if I feel better on the 50 mg.       thanks for your help and considering this.      Shawna Townsend

## 2021-11-08 ENCOUNTER — OFFICE VISIT (OUTPATIENT)
Dept: FAMILY MEDICINE CLINIC | Age: 71
End: 2021-11-08
Payer: MEDICARE

## 2021-11-08 VITALS
DIASTOLIC BLOOD PRESSURE: 74 MMHG | SYSTOLIC BLOOD PRESSURE: 136 MMHG | TEMPERATURE: 97.3 F | OXYGEN SATURATION: 97 % | HEART RATE: 76 BPM | RESPIRATION RATE: 16 BRPM

## 2021-11-08 DIAGNOSIS — I10 ESSENTIAL HYPERTENSION: ICD-10-CM

## 2021-11-08 DIAGNOSIS — E03.9 ACQUIRED HYPOTHYROIDISM: Primary | ICD-10-CM

## 2021-11-08 DIAGNOSIS — E11.65 TYPE 2 DIABETES MELLITUS WITH HYPERGLYCEMIA, WITHOUT LONG-TERM CURRENT USE OF INSULIN (HCC): ICD-10-CM

## 2021-11-08 PROCEDURE — 99214 OFFICE O/P EST MOD 30 MIN: CPT | Performed by: FAMILY MEDICINE

## 2021-11-08 RX ORDER — SULFACETAMIDE SODIUM 100 MG/ML
SOLUTION/ DROPS OPHTHALMIC
COMMUNITY
Start: 2021-10-20

## 2021-11-08 SDOH — ECONOMIC STABILITY: FOOD INSECURITY: WITHIN THE PAST 12 MONTHS, YOU WORRIED THAT YOUR FOOD WOULD RUN OUT BEFORE YOU GOT MONEY TO BUY MORE.: NEVER TRUE

## 2021-11-08 SDOH — ECONOMIC STABILITY: FOOD INSECURITY: WITHIN THE PAST 12 MONTHS, THE FOOD YOU BOUGHT JUST DIDN'T LAST AND YOU DIDN'T HAVE MONEY TO GET MORE.: NEVER TRUE

## 2021-11-08 ASSESSMENT — ENCOUNTER SYMPTOMS
COUGH: 0
CONSTIPATION: 0
ABDOMINAL PAIN: 0
NAUSEA: 0
WHEEZING: 0
SORE THROAT: 0
DIARRHEA: 0
RHINORRHEA: 0
SHORTNESS OF BREATH: 0

## 2021-11-08 ASSESSMENT — SOCIAL DETERMINANTS OF HEALTH (SDOH): HOW HARD IS IT FOR YOU TO PAY FOR THE VERY BASICS LIKE FOOD, HOUSING, MEDICAL CARE, AND HEATING?: NOT HARD AT ALL

## 2021-11-08 NOTE — PROGRESS NOTES
patient is not nervous/anxious. Physical Exam  Vitals and nursing note reviewed. Constitutional:       Appearance: She is well-developed. HENT:      Head: Normocephalic and atraumatic. Eyes:      General: No scleral icterus. Right eye: No discharge. Left eye: No discharge. Conjunctiva/sclera: Conjunctivae normal.   Cardiovascular:      Rate and Rhythm: Normal rate and regular rhythm. Heart sounds: Normal heart sounds. Pulmonary:      Effort: Pulmonary effort is normal.      Breath sounds: Normal breath sounds. No wheezing. Skin:     General: Skin is warm and dry. Neurological:      Mental Status: She is alert and oriented to person, place, and time. Mental status is at baseline. Psychiatric:         Behavior: Behavior normal.         Thought Content: Thought content normal.         Judgment: Judgment normal.         Vitals:    11/08/21 0931   BP: 136/74   Pulse: 76   Resp: 16   Temp: 97.3 °F (36.3 °C)   SpO2: 97%              An electronic signature was used to authenticate this note.     --nA Medina MD

## 2021-11-09 ENCOUNTER — NURSE ONLY (OUTPATIENT)
Dept: LAB | Age: 71
End: 2021-11-09

## 2021-11-09 DIAGNOSIS — E03.9 ACQUIRED HYPOTHYROIDISM: ICD-10-CM

## 2021-11-09 LAB
T4 FREE: 0.79 NG/DL (ref 0.93–1.76)
TSH SERPL DL<=0.05 MIU/L-ACNC: 4.67 UIU/ML (ref 0.4–4.2)

## 2021-12-06 DIAGNOSIS — E03.9 ACQUIRED HYPOTHYROIDISM: ICD-10-CM

## 2021-12-06 RX ORDER — METOPROLOL SUCCINATE 25 MG/1
25 TABLET, EXTENDED RELEASE ORAL 2 TIMES DAILY
Qty: 180 TABLET | Refills: 1 | Status: SHIPPED | OUTPATIENT
Start: 2021-12-06 | End: 2022-09-22 | Stop reason: SDUPTHER

## 2021-12-06 NOTE — TELEPHONE ENCOUNTER
Hansa Tripp called requesting a refill on the following medications:  Requested Prescriptions     Pending Prescriptions Disp Refills    metoprolol succinate (TOPROL XL) 25 MG extended release tablet 180 tablet 0     Sig: Take 1 tablet by mouth 2 times daily     Pharmacy verified:  Pt is living in [place] for the winter. [facility] needs our office to call them - they can't accept an electronic request because she's at a different address for this refill  Please call [phone]. They'll give fax information, and will need to verify Shannan's forwarding address which is [phone] [facility] [address] Gaetano Heath [address] [place] [address], [address].    Shannan apologized for the inconvenience      Date of last visit: 10/21/21  Date of next visit (if applicable): 6/54/5006

## 2021-12-07 RX ORDER — LEVOTHYROXINE, LIOTHYRONINE 38; 9 UG/1; UG/1
TABLET ORAL
Qty: 90 TABLET | Refills: 3 | Status: SHIPPED | OUTPATIENT
Start: 2021-12-07 | End: 2022-09-22 | Stop reason: SDUPTHER

## 2022-02-02 ENCOUNTER — TELEPHONE (OUTPATIENT)
Dept: CARDIOLOGY CLINIC | Age: 72
End: 2022-02-02

## 2022-02-02 NOTE — TELEPHONE ENCOUNTER
Ruddy Bearden \"Shannan\"  Sarath Gibbons MD Just now (4:04 PM)           When starting Toprol ER 25 mg,  had some GI issues. They subsided a bit by taking Toprol at nite. Have not had to up dose to 50mg at all. Now having GI issues again. Not sure it is related tho. Having a lot of gas. This all started suddenly after eating out so thinking it is from food but it has been a couple of weeks. No blood in stool and no diarrhea other than after eating the food. I am wondering if i could go off Toprol for a couple weeks to see if it improves. Willing to start again after this trial.   My heart rates have been good. Resting is 50-60. Exercise is good also. BP good. Will watch it close. If ok to go off, how do i go off?  Abruptly or wean.      Thanks  Thom Moore  [phone]

## 2022-03-28 ENCOUNTER — PATIENT MESSAGE (OUTPATIENT)
Dept: FAMILY MEDICINE CLINIC | Age: 72
End: 2022-03-28

## 2022-03-28 DIAGNOSIS — E03.9 HYPOTHYROIDISM, UNSPECIFIED TYPE: ICD-10-CM

## 2022-03-28 DIAGNOSIS — M25.50 MULTIPLE JOINT PAIN: Primary | ICD-10-CM

## 2022-03-28 NOTE — TELEPHONE ENCOUNTER
From: Nickie Alvarez  To: Dr. Aye Hurley: 3/28/2022 6:01 AM EDT  Subject: Arthritis    I have been having an awful time with arthritis. I hurt all over. Along with my worsening back and knee pain, my shoulders, hands, hips and feet now all hurt and are stiff. I get a carpel tunnel like tingling in my hands and my skin is dry. I have occasional leg cramps. I struggle to find a position to sleep cause everything hurts. In the past, i have used motrin for arthritis but I have not been able to use it because of being on Eliquis. Could there be an association with the arthritis type pain and keeping my thyroid level in hypo state? Would a recheck of TSH/T4 be appropriate? If so could you send to [address] Pathology lab, [place] office?    Thanks

## 2022-03-30 LAB
ANTI-NUCLEAR ANTIBODY (ANA): NEGATIVE
RHEUMATOID FACTOR: <10 IU/ML
T4 FREE: 0.74 NG/DL (ref 0.61–1.6)
TSH SERPL DL<=0.05 MIU/L-ACNC: 3.4 UIU/ML (ref 0.49–4.67)
URIC ACID: 5.5 MG/DL (ref 2.6–7.2)

## 2022-04-25 ENCOUNTER — OFFICE VISIT (OUTPATIENT)
Dept: CARDIOLOGY CLINIC | Age: 72
End: 2022-04-25
Payer: MEDICARE

## 2022-04-25 VITALS
BODY MASS INDEX: 33.43 KG/M2 | HEIGHT: 66 IN | HEART RATE: 105 BPM | DIASTOLIC BLOOD PRESSURE: 94 MMHG | WEIGHT: 208 LBS | SYSTOLIC BLOOD PRESSURE: 175 MMHG

## 2022-04-25 DIAGNOSIS — R00.2 HEART PALPITATIONS: Primary | ICD-10-CM

## 2022-04-25 DIAGNOSIS — I48.0 PAROXYSMAL ATRIAL FIBRILLATION (HCC): ICD-10-CM

## 2022-04-25 PROCEDURE — 93000 ELECTROCARDIOGRAM COMPLETE: CPT | Performed by: NUCLEAR MEDICINE

## 2022-04-25 PROCEDURE — 99213 OFFICE O/P EST LOW 20 MIN: CPT | Performed by: NUCLEAR MEDICINE

## 2022-04-25 RX ORDER — SENNOSIDES 8.6 MG
650 CAPSULE ORAL EVERY 8 HOURS PRN
COMMUNITY

## 2022-04-25 NOTE — PROGRESS NOTES
[id number] [facility] .  [address] [address]  Dept: [phone]  Dept Fax: [phone]  Loc: [phone]    Visit Date: 2022    Diane Barrios is a 80 y.o. female who presents todayfor:  Chief Complaint   Patient presents with    Atrial Fibrillation    Hypertension   known A fib   No bleeding issues  No chest pain   No recent episodes  No dizziness  No syncope  No angina         HPI:  HPI  Past Medical History:   Diagnosis Date    Hypothyroidism     Insulin resistance     Pancreatitis     r/t Gall bladder      Past Surgical History:   Procedure Laterality Date    BREAST BIOPSY Left     benign     SECTION      CHOLECYSTECTOMY      HYSTERECTOMY  1985    OVARY REMOVAL Bilateral     ND BX OF BREAST, NEEDLE CORE, IMAGE GUIDE Left     benign    TONSILLECTOMY       Family History   Problem Relation Age of Onset    Diabetes Mother     Heart Disease Mother     Cancer Mother     Breast Cancer Mother 79    Heart Disease Father     Cancer Father         bone    Heart Disease Paternal Uncle     Heart Disease Paternal Grandmother     Heart Disease Paternal Grandfather      Social History     Tobacco Use    Smoking status: Never Smoker    Smokeless tobacco: Never Used   Substance Use Topics    Alcohol use: No      Current Outpatient Medications   Medication Sig Dispense Refill    acetaminophen (TYLENOL 8 HOUR) 650 MG extended release tablet Take 650 mg by mouth every 8 hours as needed for Pain      NP THYROID 60 MG tablet TAKE 1 TABLET DAILY (Patient taking differently: 60 mg 6 days a week  90 mg on sundays) 90 tablet 3    metoprolol succinate (TOPROL XL) 25 MG extended release tablet Take 1 tablet by mouth 2 times daily (Patient taking differently: Take 25 mg by mouth 2 times daily Pt is taking 1/2 tablet daily) 180 tablet 1    sulfacetamide (BLEPH-10) 10 % ophthalmic solution       apixaban (ELIQUIS) 5 MG TABS tablet Take 1 tablet by mouth 2 times daily 180 tablet 3    atorvastatin (LIPITOR) 20 MG tablet Take 1 tablet by mouth daily 90 tablet 3    gabapentin (NEURONTIN) 100 MG capsule Take 1 capsule by mouth nightly for 90 days. Intended supply: 90 days 90 capsule 0    blood glucose test strips (ASCENSIA AUTODISC VI;ONE TOUCH ULTRA TEST VI) strip Test daily. One Touch Verio test strips Dx code E11.9 100 each 3    clobetasol (TEMOVATE) 0.05 % cream Apply topically daily         No current facility-administered medications for this visit.      No Known Allergies  Health Maintenance   Topic Date Due    DEXA (modify frequency per FRAX score)  Never done    Diabetic microalbuminuria test  12/11/2013    COVID-19 Vaccine (3 - Booster for Moderna series) 07/14/2021    Pneumococcal 65+ years Vaccine (3 - PPSV23 or PCV20) 04/14/2023 (Originally 12/4/2019)    A1C test (Diabetic or Prediabetic)  08/12/2022    Lipids  08/12/2022    Breast cancer screen  08/18/2022    Depression Screen  08/19/2022    Annual Wellness Visit (AWV)  08/20/2022    Diabetic retinal exam  10/20/2022    Diabetic foot exam  11/08/2022    TSH  03/29/2023    DTaP/Tdap/Td vaccine (3 - Td or Tdap) 10/23/2028    Colorectal Cancer Screen  09/10/2029    Flu vaccine  Completed    Shingles Vaccine  Completed    Hepatitis C screen  Completed    Hepatitis A vaccine  Aged Out    Hib vaccine  Aged Out    Meningococcal (ACWY) vaccine  Aged Out       Subjective:  Review of Systems  General:   No fever, no chills, some fatigue or weight loss  Pulmonary:    some dyspnea, no wheezing  Cardiac:    Denies recent chest pain,   GI:     No nausea or vomiting, no abdominal pain  Neuro:    No dizziness or light headedness,   Musculoskeletal:  No recent active issues  Extremities:   No edema, no obvious claudication       Objective:  Physical Exam  BP (!) 175/94   Pulse 105   Ht 5' 6\" (1.676 m)   Wt 208 lb (94.3 kg)   BMI 33.57 kg/m² General:   Well developed, well nourished  Lungs:   Clear to auscultation  Heart:    Normal S1 S2, Slight murmur. no rubs, no gallops  Abdomen:   Soft, non tender, no organomegalies, positive bowel sounds  Extremities:   No edema, no cyanosis, good peripheral pulses  Neurological:   Awake, alert, oriented. No obvious focal deficits  Musculoskelatal:  No obvious deformities    Assessment:      Diagnosis Orders   1. Heart palpitations  EKG 12 Lead   2. Paroxysmal atrial fibrillation (HCC)     as above  Cardiac fair for now   No new issues   Plan:  No follow-ups on file. As above  Continue risk factor modification and medical management  Thank you for allowing me to participate in the care of your patient. Please don't hesitate to contact me regarding any further issues related to the patient care    Orders Placed:  Orders Placed This Encounter   Procedures    EKG 12 Lead     Order Specific Question:   Reason for Exam?     Answer: Other       Medications Prescribed:  No orders of the defined types were placed in this encounter. Discussed use, benefit, and side effects of prescribed medications. All patient questions answered. Pt voicedunderstanding. Instructed to continue current medications, diet and exercise. Continue risk factor modification and medical management. Patient agreed with treatment plan. Follow up as directed.     Electronically signedby Patricia Nguyen MD on 4/25/2022 at 12:07 PM

## 2022-05-11 ENCOUNTER — TELEPHONE (OUTPATIENT)
Dept: CARDIOLOGY CLINIC | Age: 72
End: 2022-05-11

## 2022-05-11 NOTE — TELEPHONE ENCOUNTER
Please see My Chart Message:     Leon Serrato \"Stephanie\"  Kelly Jalloh MD 8 hours ago (10:47 PM)       I will have colonoscopy the end of May. I believe that i will need to stop eliquis several days prior to exam. Is Dr Guillermina Frank ok with 2-3 days not taking Eliquis? Dr Davenport Bears office said they would contact you but I have not recieved that info. Thanks for following up.    stephanie alarcon

## 2022-07-21 ENCOUNTER — TELEPHONE (OUTPATIENT)
Dept: CARDIOLOGY CLINIC | Age: 72
End: 2022-07-21

## 2022-07-22 NOTE — TELEPHONE ENCOUNTER
Well   Eliquis is a bid drug  If we want to reduce the dose it would be 2.5 bid   But will be a little higher stroke risk   She needs to decide what she wants to do   I am okay with 2.5 bid as long as she understands that it is less protective stroke wise

## 2022-08-15 ENCOUNTER — HOSPITAL ENCOUNTER (OUTPATIENT)
Dept: MAMMOGRAPHY | Age: 72
Discharge: HOME | End: 2022-08-15
Payer: MEDICARE

## 2022-08-15 DIAGNOSIS — Z12.31 VISIT FOR SCREENING MAMMOGRAM: ICD-10-CM

## 2022-08-15 PROCEDURE — 77067 SCR MAMMO BI INCL CAD: CPT

## 2022-09-09 DIAGNOSIS — E11.65 TYPE 2 DIABETES MELLITUS WITH HYPERGLYCEMIA, WITHOUT LONG-TERM CURRENT USE OF INSULIN (HCC): ICD-10-CM

## 2022-09-11 RX ORDER — ATORVASTATIN CALCIUM 20 MG/1
TABLET, FILM COATED ORAL
Qty: 90 TABLET | Refills: 3 | Status: SHIPPED | OUTPATIENT
Start: 2022-09-11

## 2022-09-19 ENCOUNTER — OFFICE VISIT (OUTPATIENT)
Dept: FAMILY MEDICINE CLINIC | Age: 72
End: 2022-09-19
Payer: MEDICARE

## 2022-09-19 VITALS
WEIGHT: 200.6 LBS | RESPIRATION RATE: 14 BRPM | BODY MASS INDEX: 32.24 KG/M2 | SYSTOLIC BLOOD PRESSURE: 134 MMHG | HEIGHT: 66 IN | TEMPERATURE: 97.5 F | OXYGEN SATURATION: 96 % | DIASTOLIC BLOOD PRESSURE: 82 MMHG | HEART RATE: 74 BPM

## 2022-09-19 DIAGNOSIS — E11.65 TYPE 2 DIABETES MELLITUS WITH HYPERGLYCEMIA, WITHOUT LONG-TERM CURRENT USE OF INSULIN (HCC): ICD-10-CM

## 2022-09-19 DIAGNOSIS — Z00.00 MEDICARE ANNUAL WELLNESS VISIT, SUBSEQUENT: Primary | ICD-10-CM

## 2022-09-19 DIAGNOSIS — E03.9 ACQUIRED HYPOTHYROIDISM: ICD-10-CM

## 2022-09-19 DIAGNOSIS — I10 ESSENTIAL HYPERTENSION: ICD-10-CM

## 2022-09-19 PROCEDURE — 1123F ACP DISCUSS/DSCN MKR DOCD: CPT | Performed by: FAMILY MEDICINE

## 2022-09-19 PROCEDURE — G0439 PPPS, SUBSEQ VISIT: HCPCS | Performed by: FAMILY MEDICINE

## 2022-09-19 ASSESSMENT — PATIENT HEALTH QUESTIONNAIRE - PHQ9
SUM OF ALL RESPONSES TO PHQ QUESTIONS 1-9: 0
SUM OF ALL RESPONSES TO PHQ QUESTIONS 1-9: 0
1. LITTLE INTEREST OR PLEASURE IN DOING THINGS: 0
SUM OF ALL RESPONSES TO PHQ QUESTIONS 1-9: 0
SUM OF ALL RESPONSES TO PHQ QUESTIONS 1-9: 0
SUM OF ALL RESPONSES TO PHQ9 QUESTIONS 1 & 2: 0
2. FEELING DOWN, DEPRESSED OR HOPELESS: 0

## 2022-09-19 ASSESSMENT — LIFESTYLE VARIABLES
HOW MANY STANDARD DRINKS CONTAINING ALCOHOL DO YOU HAVE ON A TYPICAL DAY: PATIENT DOES NOT DRINK
HOW OFTEN DO YOU HAVE A DRINK CONTAINING ALCOHOL: NEVER

## 2022-09-19 NOTE — PROGRESS NOTES
Medicare Annual Wellness Visit    Blanca Harris is here for Medicare AWV    Assessment & Plan   Medicare annual wellness visit, subsequent  Type 2 diabetes mellitus with hyperglycemia, without long-term current use of insulin ([facility] [address]  -     Lipid Panel; Future  -     Hemoglobin A1C; Future  -     CBC with Auto Differential; Future  -     Comprehensive Metabolic Panel; Future  -     Protein / creatinine ratio, urine; Future  Acquired hypothyroidism  -     T4, Free; Future  -     TSH; Future  Essential hypertension    Recommendations for Preventive Services Due: see orders and patient instructions/AVS.  Recommended screening schedule for the next 5-10 years is provided to the patient in written form: see Patient Instructions/AVS.     Return in 6 months (on 3/19/2023) for Medicare Annual Wellness Visit in 1 year, follow up. Subjective       Patient's complete Health Risk Assessment and screening values have been reviewed and are found in Flowsheets. The following problems were reviewed today and where indicated follow up appointments were made and/or referrals ordered. Positive Risk Factor Screenings with Interventions:             General Health and ACP:  General  In general, how would you say your health is?: Good  In the past 7 days, have you experienced any of the following: New or Increased Pain, New or Increased Fatigue, Loneliness, Social Isolation, Stress or Anger?: No  Do you get the social and emotional support that you need?: Yes  Do you have a Living Will?: Yes    Advance Directives       Power of  Living Will ACP-Advance Directive ACP-Power of     Not on File Not on File Not on File Not on File          General Health Risk Interventions:  Pain issues: pt working on diet/lifestyle changes to help reduce pain/inflammation.       Health Habits/Nutrition:  Physical Activity: Sufficiently Active    Days of Exercise per Week: 7 days    Minutes of Exercise per Session: 60 min     Have you lost any weight without trying in the past 3 months?: No  Body mass index: (!) 32.39  Have you seen the dentist within the past year?: Yes  Health Habits/Nutrition Interventions:  Nutritional issues:  educational materials for healthy, well-balanced diet provided             Objective   Vitals:    09/19/22 1053   BP: 134/82   Site: Left Upper Arm   Position: Sitting   Cuff Size: Medium Adult   Pulse: 74   Resp: 14   Temp: 97.5 °F (36.4 °C)   TempSrc: Temporal   SpO2: 96%   Weight: 200 lb 9.6 oz (91 kg)   Height: 5' 5.98\" (1.676 m)      Body mass index is 32.39 kg/m². General Appearance: alert and oriented to person, place and time, well-developed and well-nourished, in no acute distress  Head: normocephalic and atraumatic  ENT: tympanic membrane, external ear and ear canal normal bilaterally, oropharynx clear and moist with normal mucous membranes  Neck: neck supple and non tender without mass, no thyromegaly or thyroid nodules, no cervical lymphadenopathy   Pulmonary/Chest: clear to auscultation bilaterally- no wheezes, rales or rhonchi, normal air movement, no respiratory distress  Cardiovascular: normal rate, normal S1 and S2, no gallops, intact distal pulses, and no carotid bruits  Abdomen: soft, non-tender, non-distended, normal bowel sounds, no masses or organomegaly       No Known Allergies  Prior to Visit Medications    Medication Sig Taking?  Authorizing Provider   atorvastatin (LIPITOR) 20 MG tablet TAKE 1 TABLET DAILY Yes Evelina Sanchez MD   NP THYROID 60 MG tablet TAKE 1 TABLET DAILY  Patient taking differently: 60 mg 6 days a week  90 mg on sundays Yes Evelina Sanchez MD   metoprolol succinate (TOPROL XL) 25 MG extended release tablet Take 1 tablet by mouth 2 times daily  Patient taking differently: Take 25 mg by mouth 2 times daily Pt is taking 1/2 tablet daily Yes Samantha Golden MD   sulfacetamide (BLEPH-10) 10 % ophthalmic solution  Yes Historical Provider, MD   apixaban (ELIQUIS) 5 MG TABS

## 2022-09-19 NOTE — PATIENT INSTRUCTIONS
Personalized Preventive Plan for Daisy Carrera - 9/19/2022  Medicare offers a range of preventive health benefits. Some of the tests and screenings are paid in full while other may be subject to a deductible, co-insurance, and/or copay. Some of these benefits include a comprehensive review of your medical history including lifestyle, illnesses that may run in your family, and various assessments and screenings as appropriate. After reviewing your medical record and screening and assessments performed today your provider may have ordered immunizations, labs, imaging, and/or referrals for you. A list of these orders (if applicable) as well as your Preventive Care list are included within your After Visit Summary for your review. Other Preventive Recommendations:    A preventive eye exam performed by an eye specialist is recommended every 1-2 years to screen for glaucoma; cataracts, macular degeneration, and other eye disorders. A preventive dental visit is recommended every 6 months. Try to get at least 150 minutes of exercise per week or 10,000 steps per day on a pedometer . Order or download the FREE \"Exercise & Physical Activity: Your Everyday Guide\" from [employer]. Call 2-203.922.3607 or search [employer] online. You need 4582-4759 mg of calcium and 1304-1658 IU of vitamin D per day. It is possible to meet your calcium requirement with diet alone, but a vitamin D supplement is usually necessary to meet this goal.  When exposed to the sun, use a sunscreen that protects against both UVA and UVB radiation with an SPF of 30 or greater. Reapply every 2 to 3 hours or after sweating, drying off with a towel, or swimming. Always wear a seat belt when traveling in a car. Always wear a helmet when riding a bicycle or motorcycle.

## 2022-09-21 LAB
ABSOLUTE BASO #: 0.05 K/UL (ref 0–0.2)
ABSOLUTE EOS #: 0.15 K/UL (ref 0–0.5)
ABSOLUTE LYMPH #: 1.15 K/UL (ref 1–4)
ABSOLUTE MONO #: 0.41 K/UL (ref 0.2–1)
ABSOLUTE NEUT #: 2.69 K/UL (ref 1.5–7.5)
ALBUMIN SERPL-MCNC: 4.1 G/DL (ref 3.5–5.2)
ALK PHOSPHATASE: 92 U/L (ref 40–142)
ALT SERPL-CCNC: 37 U/L (ref 5–40)
ANION GAP SERPL CALCULATED.3IONS-SCNC: 9 MEQ/L (ref 7–16)
AST SERPL-CCNC: 28 U/L (ref 9–40)
AVERAGE GLUCOSE: 137 MG/DL
BASOPHILS RELATIVE PERCENT: 1.1 %
BILIRUB SERPL-MCNC: 0.8 MG/DL
BUN BLDV-MCNC: 12 MG/DL (ref 8–23)
CALCIUM SERPL-MCNC: 9.4 MG/DL (ref 8.5–10.5)
CHLORIDE BLD-SCNC: 105 MEQ/L (ref 95–107)
CHOLESTEROL/HDL RATIO: 3.6 RATIO
CHOLESTEROL: 151 MG/DL
CO2: 28 MEQ/L (ref 19–31)
CREAT SERPL-MCNC: 0.88 MG/DL (ref 0.6–1.3)
CREATINE, URINE: 240.9 MG/DL
EGFR IF NONAFRICAN AMERICAN: 70 ML/MIN/1.73
EOSINOPHILS RELATIVE PERCENT: 3.4 %
GLUCOSE: 124 MG/DL (ref 70–99)
HBA1C MFR BLD: 6.4 % (ref 4.2–5.6)
HCT VFR BLD CALC: 42.1 % (ref 34–45)
HDLC SERPL-MCNC: 42 MG/DL
HEMOGLOBIN: 14.4 G/DL (ref 11.5–15.5)
LDL CHOLESTEROL CALCULATED: 90 MG/DL
LDL/HDL RATIO: 2.1 RATIO
LYMPHOCYTE %: 25.7 %
MCH RBC QN AUTO: 29.9 PG (ref 25–33)
MCHC RBC AUTO-ENTMCNC: 34.2 G/DL (ref 31–36)
MCV RBC AUTO: 87.5 FL (ref 80–99)
MONOCYTES # BLD: 9.2 %
NEUTROPHILS RELATIVE PERCENT: 60.2 %
PDW BLD-RTO: 13.4 % (ref 11.5–15)
PLATELETS: 229 K/UL (ref 130–400)
PMV BLD AUTO: 11.1 FL (ref 9.3–13)
POTASSIUM SERPL-SCNC: 4.6 MEQ/L (ref 3.5–5.4)
PROTEIN, URINE: 13 MG/DL
PROTEIN/CREAT RATIO: 54 MG/G
RBC: 4.81 M/UL (ref 3.8–5.4)
SODIUM BLD-SCNC: 142 MEQ/L (ref 133–146)
T4 FREE: 0.95 NG/DL (ref 0.8–1.9)
TOTAL PROTEIN: 6.3 G/DL (ref 6.1–8.3)
TRIGL SERPL-MCNC: 93 MG/DL
TSH SERPL DL<=0.05 MIU/L-ACNC: 2.4 UIU/ML (ref 0.4–4.1)
VLDLC SERPL CALC-MCNC: 19 MG/DL
WBC: 4.5 K/UL (ref 3.5–11)

## 2022-09-22 ENCOUNTER — PATIENT MESSAGE (OUTPATIENT)
Dept: FAMILY MEDICINE CLINIC | Age: 72
End: 2022-09-22

## 2022-09-22 DIAGNOSIS — E03.9 ACQUIRED HYPOTHYROIDISM: ICD-10-CM

## 2022-09-22 RX ORDER — METOPROLOL SUCCINATE 25 MG/1
25 TABLET, EXTENDED RELEASE ORAL 2 TIMES DAILY
Qty: 180 TABLET | Refills: 3 | Status: SHIPPED | OUTPATIENT
Start: 2022-09-22

## 2022-09-22 RX ORDER — LEVOTHYROXINE AND LIOTHYRONINE 57; 13.5 UG/1; UG/1
90 TABLET ORAL WEEKLY
Qty: 13 TABLET | Refills: 0 | Status: SHIPPED | OUTPATIENT
Start: 2022-09-22 | End: 2022-09-26 | Stop reason: SDUPTHER

## 2022-09-22 RX ORDER — LEVOTHYROXINE AND LIOTHYRONINE 38; 9 UG/1; UG/1
TABLET ORAL
Qty: 77 TABLET | Refills: 0 | Status: SHIPPED | OUTPATIENT
Start: 2022-09-22 | End: 2022-09-26 | Stop reason: SDUPTHER

## 2022-09-22 NOTE — TELEPHONE ENCOUNTER
From: Kb Gilman  To: Dr. Garcia Given: 9/22/2022 8:48 AM EDT  Subject: questions/requests    questions on blood work. 1. I dont see lab results for kidney function. Thought it was ordered. If not done, ok to wait until April blood work? 2. I saw urine protein/creatinine was done. Lab results didnt show normal ranges or I didnt understand. Was this result ok? 3. Does labs that were done include any results checking for fatty liver. If so is that ok? Prescription requests:    I plan to order vacation supply meds before leaving for [place] by mid november. ( and to get ahead of new drug plan coming in January 1, 2023)   I currently do not have any refills for my Thyroid meds. wondering if I could get order to cover 1 year and could those be sent to [facility]. Rx I need are: Please use [facility] 90 day supply    NP Thyroid 60mg (1 gr)   1 tab 6 days a week (mon thru saturday) # 77    NP Thyroid 90 mg (1.5 gr)  1 tab one day a week (sunday) # 13    thanks for your help.  Ender Balbuena

## 2022-09-26 RX ORDER — LEVOTHYROXINE AND LIOTHYRONINE 57; 13.5 UG/1; UG/1
90 TABLET ORAL WEEKLY
Qty: 13 TABLET | Refills: 3 | Status: SHIPPED | OUTPATIENT
Start: 2022-09-26

## 2022-09-26 RX ORDER — LEVOTHYROXINE AND LIOTHYRONINE 38; 9 UG/1; UG/1
TABLET ORAL
Qty: 77 TABLET | Refills: 3 | Status: SHIPPED | OUTPATIENT
Start: 2022-09-26

## 2022-10-19 ENCOUNTER — APPOINTMENT (OUTPATIENT)
Dept: CT IMAGING | Age: 72
End: 2022-10-19
Payer: MEDICARE

## 2022-10-19 ENCOUNTER — HOSPITAL ENCOUNTER (EMERGENCY)
Age: 72
Discharge: HOME | End: 2022-10-19
Attending: EMERGENCY MEDICINE
Payer: MEDICARE

## 2022-10-19 VITALS
WEIGHT: 206 LBS | SYSTOLIC BLOOD PRESSURE: 127 MMHG | TEMPERATURE: 97.7 F | DIASTOLIC BLOOD PRESSURE: 87 MMHG | OXYGEN SATURATION: 98 % | HEART RATE: 104 BPM | HEIGHT: 66 IN | BODY MASS INDEX: 33.11 KG/M2 | RESPIRATION RATE: 14 BRPM

## 2022-10-19 DIAGNOSIS — S09.90XA CLOSED HEAD INJURY, INITIAL ENCOUNTER: Primary | ICD-10-CM

## 2022-10-19 PROCEDURE — 99284 EMERGENCY DEPT VISIT MOD MDM: CPT

## 2022-10-19 PROCEDURE — 70450 CT HEAD/BRAIN W/O DYE: CPT

## 2022-10-19 ASSESSMENT — LIFESTYLE VARIABLES: HOW OFTEN DO YOU HAVE A DRINK CONTAINING ALCOHOL: NEVER

## 2022-10-19 ASSESSMENT — PAIN - FUNCTIONAL ASSESSMENT
PAIN_FUNCTIONAL_ASSESSMENT: NONE - DENIES PAIN
PAIN_FUNCTIONAL_ASSESSMENT: NONE - DENIES PAIN

## 2022-10-19 NOTE — ED NOTES
Observed patient sitting in the chair, fully dressed in coat, resp easy. Patient stable for discharge. Patient educated on medication, pain control, icing, signs and symptoms, and follow up. Patient verbalized understanding, reassurance provided, questions answered. Observed patient steadily ambulate from department after discharge.       Myranda Gunn RN  10/19/22 1717

## 2022-10-19 NOTE — ED PROVIDER NOTES
[address] [facility]  [facility] [address]  Phone: Juan Francisco 12 COMPLAINT    Chief Complaint   Patient presents with    Head Injury     Hit the back of the head around 0020       HPI    Nabil Campbell is a 71 y.o. female who presents above-noted complaint. Patient's been doing fine. She was doing some sewing. Dropped the needle. Subsequently went over to bend forward and then hit her head on the back. Did not pass out but has some swelling. Asked nurse recommended she come the hospital as she is on a blood thinner. No other symptoms. PAST MEDICAL HISTORY    Past Medical History:   Diagnosis Date    Hypothyroidism     Insulin resistance     Pancreatitis     r/t Gall bladder       SURGICAL HISTORY    Past Surgical History:   Procedure Laterality Date    BREAST BIOPSY Left     benign     SECTION      CHOLECYSTECTOMY      HYSTERECTOMY (CERVIX STATUS UNKNOWN)      OVARY REMOVAL Bilateral     OH BX OF BREAST, NEEDLE CORE, IMAGE GUIDE Left     benign    TONSILLECTOMY         CURRENT MEDICATIONS    Current Outpatient Rx   Medication Sig Dispense Refill    thyroid (NP THYROID) 60 MG tablet 60 mg PO daily Monday- Saturday 77 tablet 3    thyroid (NP THYROID) 90 MG tablet Take 1 tablet by mouth once a week On  13 tablet 3    metoprolol succinate (TOPROL XL) 25 MG extended release tablet Take 1 tablet by mouth 2 times daily 180 tablet 3    apixaban (ELIQUIS) 5 MG TABS tablet Take 1 tablet by mouth 2 times daily 180 tablet 3    atorvastatin (LIPITOR) 20 MG tablet TAKE 1 TABLET DAILY 90 tablet 3    acetaminophen (TYLENOL) 650 MG extended release tablet Take 650 mg by mouth every 8 hours as needed for Pain (Patient not taking: No sig reported)      sulfacetamide (BLEPH-10) 10 % ophthalmic solution       gabapentin (NEURONTIN) 100 MG capsule Take 1 capsule by mouth nightly for 90 days.  Intended supply: 90 days (Patient not taking: Reported on 9/19/2022) 90 capsule 0    blood glucose test strips (ASCENSIA AUTODISC VI;ONE TOUCH ULTRA TEST VI) strip Test daily. One Touch Verio test strips Dx code E11.9 100 each 3    clobetasol (TEMOVATE) 0.05 % cream Apply topically daily   (Patient not taking: No sig reported)         ALLERGIES    No Known Allergies    FAMILY HISTORY    Family History   Problem Relation Age of Onset    Diabetes Mother     Heart Disease Mother     Cancer Mother     Breast Cancer Mother 79    Heart Disease Father     Cancer Father         bone    Heart Disease Paternal Uncle     Heart Disease Paternal Grandmother     Heart Disease Paternal Grandfather        SOCIAL HISTORY    Social History     Socioeconomic History    Marital status:      Spouse name: None    Number of children: None    Years of education: None    Highest education level: None   Tobacco Use    Smoking status: Never    Smokeless tobacco: Never   Substance and Sexual Activity    Alcohol use: No     Social Determinants of Health     Financial Resource Strain: Low Risk     Difficulty of Paying Living Expenses: Not hard at all   Food Insecurity: No Food Insecurity    Worried About Running Out of Food in the Last Year: Never true    Ran Out of Food in the Last Year: Never true   Physical Activity: Sufficiently Active    Days of Exercise per Week: 7 days    Minutes of Exercise per Session: 60 min       REVIEW OF SYSTEMS    Poss for head injury. No weakness numbness or ting. No neck pain chest pain abdominal pain or other injuries. All systems negative except as marked. PHYSICAL EXAM    VITAL SIGNS: /87   Pulse (!) 104   Temp 97.7 °F (36.5 °C)   Resp 14   Ht 5' 6\" (1.676 m)   Wt 206 lb (93.4 kg)   SpO2 98%   BMI 33.25 kg/m²    Constitutional:  Alert not toxic or ill,   HENT:  normocephalic, apical hematoma no step-offs or bony deformities.   No bleeding bilateral external ears normal, Oropharynx moist, No oral exudates, Nose normal.  Cervical Spine: Normal range of motion,  No stridor. No tenderness, Supple,  Eyes:  No discharge or  Swelling,Conjunctiva normal, PERRL, EOMI,  Respiratory: No respiratory distress, Normal breath sounds,  No wheezing, No chest tenderness. Cardiovascular:  Normal heart rate, Normal rhythm, No murmurs, No rubs, No gallops. GI:  No reproducible pain, Bowel sounds normal, Soft, No masses, No pulsatile masses. No tenderness  Musculoskeletal:  Intact distal pulses, No edema, No tenderness, No cyanosis, No clubbing. Good range of motion in all major joints. No tenderness to palpation or major deformities noted. Back:No tenderness. Integument:  Warm, Dry, No erythema, No rash (on exposed areas)   Lymphatic:  No lymphadenopathy noted. Neurologic:  Alert & oriented x 3, Normal motor function, Normal sensory function, No focal deficits noted. Psychiatric:  Affect normal, Judgment normal, Mood normal.     EKG                           RADIOLOGY    CT HEAD WO CONTRAST   Final Result   Impression:   No acute intracranial abnormality is identified. This document has been electronically signed by: Angela Pulliam MD on    10/19/2022 01:08 AM      All CTs at this facility use dose modulation techniques and iterative    reconstructions, and/or weight-based dosing   when appropriate to reduce radiation to a low as reasonably achievable. PROCEDURES    none      CONSULTS:  None      CRITICAL CARE:  None    SCREENINGS:  /87   Pulse (!) 104   Temp 97.7 °F (36.5 °C)   Resp 14   Ht 5' 6\" (1.676 m)   Wt 206 lb (93.4 kg)   SpO2 98%   BMI 33.25 kg/m²     Screening For Hypertension and Follow-up (#317)  patient informed that blood pressure is abnormal and in the pre-hypertension range and should be rechecked by primary care      Screening For Tobacco Use and Cessation Intervention (#226):   reports that she has never smoked.  She has never used smokeless tobacco.  Non-smoker not applicable for screen      #415 - EM: Utilization of CT for Minor Blunt Head Trauma (Adult)   [ ] Patient conditions that are excluded:  [ ] Patient has ventricular shunt  [ ] Patient has brain tumor  [ ] Patient is pregnant  [ ] Patient has multi-system trauma    [x] Patient taking an antiplatelet medication (excluding aspirin)  [ ] Head CT not ordered by emergency care clinician  [ ] Head CT ordered for reasons other than trauma        ED Melecio Janeth Chapin 630 studies reviewed. (See chart for details)  Isolated occipital pain and discomfort after hitting her head. No loss of consciousness but on blood thinners. Check a CT of the area to rule out occult bleed or other intracranial process. Suspicion is low although she has occipital with [place] Pleva and thinners. REASSESSMENT  1:11 AM  Patient rechecked and updated on lab/xray status, progress and results. Patient was reassessed and condition was unchanged after no treatment. .. Needs nothing else at this time. CAT scan reveals no acute intracranial process. Reassured patient in regards to care monitoring. Continue ice to any swollen areas. Tylenol for pain. Following with primary care        FINAL IMPRESSION    1.  Closed head injury, initial encounter         PATIENT REFERRED TO:  Tricia Cerrato MD  [address] [place] [facility] [address] [phone]    Call   For evaluation, Follow up from ER condition    DISCHARGE MEDICATIONS:  New Prescriptions    No medications on file           Mulu Elliott MD  10/19/22 0112

## 2022-10-19 NOTE — ED TRIAGE NOTES
Patient reported, \"I was sewing a quilt and bent down to  a pin I dropped, and hit the back of my head on the counter. Large bump, I called a nurse and they told me to come in.\" Denied nausea, loc, vomiting, or headache. Observed the patient with ice pack on the bump on the back of the head.

## 2022-10-19 NOTE — DISCHARGE INSTRUCTIONS
Monitor for vomiting worsening pain or other issues. Take Tylenol for pain. Use ice to any area.   Follow-up with primary care if worsening changes or progression

## 2022-10-21 ENCOUNTER — TELEPHONE (OUTPATIENT)
Dept: FAMILY MEDICINE CLINIC | Age: 72
End: 2022-10-21

## 2022-11-08 ENCOUNTER — OFFICE VISIT (OUTPATIENT)
Dept: CARDIOLOGY CLINIC | Age: 72
End: 2022-11-08
Payer: MEDICARE

## 2022-11-08 VITALS
DIASTOLIC BLOOD PRESSURE: 71 MMHG | HEIGHT: 66 IN | HEART RATE: 74 BPM | WEIGHT: 200 LBS | BODY MASS INDEX: 32.14 KG/M2 | SYSTOLIC BLOOD PRESSURE: 140 MMHG

## 2022-11-08 DIAGNOSIS — E78.01 FAMILIAL HYPERCHOLESTEROLEMIA: ICD-10-CM

## 2022-11-08 DIAGNOSIS — I48.0 PAROXYSMAL ATRIAL FIBRILLATION (HCC): Primary | ICD-10-CM

## 2022-11-08 DIAGNOSIS — E03.8 OTHER SPECIFIED HYPOTHYROIDISM: ICD-10-CM

## 2022-11-08 DIAGNOSIS — I10 PRIMARY HYPERTENSION: ICD-10-CM

## 2022-11-08 PROCEDURE — 3078F DIAST BP <80 MM HG: CPT | Performed by: NURSE PRACTITIONER

## 2022-11-08 PROCEDURE — 99214 OFFICE O/P EST MOD 30 MIN: CPT | Performed by: NURSE PRACTITIONER

## 2022-11-08 PROCEDURE — 3074F SYST BP LT 130 MM HG: CPT | Performed by: NURSE PRACTITIONER

## 2022-11-08 PROCEDURE — 1123F ACP DISCUSS/DSCN MKR DOCD: CPT | Performed by: NURSE PRACTITIONER

## 2022-11-08 NOTE — PROGRESS NOTES
[address] [address]  [place] [address]  Dept: [phone]  Dept Fax: [phone]  Loc: [phone]    Visit Date: 2022    Primary cardiologist: Dr. Avelina Pedro is a 71 y.o. female who presents today for: Follow-up regarding palpitations    Chief Complaint   Patient presents with    Check-Up    Palpitations     10/19/2022 seen in the ED after striking her head on a counter while standing back up from picking up an object off the floor that she had dropped. No loss of consciousness but did have a significant bump on the back of her head and came in for evaluation secondary to her anticoagulant therapy regarding her atrial fibrillation. CT scan of the head was negative. She was neurologically intact. Discharged to home. Last seen in the office 2022 per Dr. Ciara Green. Per office note:  known A fib   No bleeding issues  No chest pain   No recent episodes  No dizziness  No syncope  No angina   Assessment:    Diagnosis Orders   1. Heart palpitations  EKG 12 Lead   2. Paroxysmal atrial fibrillation (HCC)      as above  Cardiac fair for now   No new issues   Plan:  No follow-ups on file.   As above  Continue risk factor modification and medical management      HPI:  HPI  Past Medical History:   Diagnosis Date    Hypothyroidism     Insulin resistance     Pancreatitis     r/t Gall bladder      Past Surgical History:   Procedure Laterality Date    BREAST BIOPSY Left     benign     SECTION      CHOLECYSTECTOMY      HYSTERECTOMY (CERVIX STATUS UNKNOWN)  1985    OVARY REMOVAL Bilateral     [place] BX OF BREAST, NEEDLE CORE, IMAGE GUIDE Left     benign    TONSILLECTOMY       Family History   Problem Relation Age of Onset    Diabetes Mother     Heart Disease Mother     Cancer Mother     Breast Cancer Mother 79    Heart Disease Father     Cancer Father         bone    Heart Disease Paternal Uncle     Heart Disease Paternal Grandmother     Heart Disease Paternal Grandfather      Social History     Tobacco Use    Smoking status: Never    Smokeless tobacco: Never   Substance Use Topics    Alcohol use: No      Current Outpatient Medications   Medication Sig Dispense Refill    thyroid (NP THYROID) 60 MG tablet 60 mg PO daily Monday- Saturday 77 tablet 3    thyroid (NP THYROID) 90 MG tablet Take 1 tablet by mouth once a week On Sunday 13 tablet 3    metoprolol succinate (TOPROL XL) 25 MG extended release tablet Take 1 tablet by mouth 2 times daily 180 tablet 3    apixaban (ELIQUIS) 5 MG TABS tablet Take 1 tablet by mouth 2 times daily 180 tablet 3    atorvastatin (LIPITOR) 20 MG tablet TAKE 1 TABLET DAILY 90 tablet 3    acetaminophen (TYLENOL) 650 MG extended release tablet Take 650 mg by mouth every 8 hours as needed for Pain      sulfacetamide (BLEPH-10) 10 % ophthalmic solution       gabapentin (NEURONTIN) 100 MG capsule Take 1 capsule by mouth nightly for 90 days. Intended supply: 90 days (Patient taking differently: Take 100 mg by mouth as needed. Intended supply: 90 days) 90 capsule 0    blood glucose test strips (ASCENSIA AUTODISC VI;ONE TOUCH ULTRA TEST VI) strip Test daily. One Touch Verio test strips Dx code E11.9 100 each 3    clobetasol (TEMOVATE) 0.05 % cream Apply topically daily       No current facility-administered medications for this visit.      No Known Allergies  Health Maintenance   Topic Date Due    Diabetic microalbuminuria test  12/11/2013    Flu vaccine (1) 08/01/2022    Diabetic foot exam  11/08/2022    COVID-19 Vaccine (4 - Booster for Gearld[employer] Jennifer series) 03/19/2023 (Originally 12/29/2021)    Pneumococcal 65+ years Vaccine (3 - PPSV23 if available, else PCV20) 04/14/2023 (Originally 12/4/2019)    DEXA (modify frequency per FRAX score)  09/19/2023 (Originally 9/19/2006)    Breast cancer screen  08/15/2023    Depression Screen  09/19/2023    A1C test (Diabetic or Prediabetic)  09/20/2023    Lipids 09/20/2023    Annual Wellness Visit (AWV)  09/20/2023    Diabetic retinal exam  10/14/2023    DTaP/Tdap/Td vaccine (3 - Td or Tdap) 10/23/2028    Colorectal Cancer Screen  09/10/2029    Shingles vaccine  Completed    Hepatitis C screen  Completed    Hepatitis A vaccine  Aged Out    Hib vaccine  Aged Out    Meningococcal (ACWY) vaccine  Aged Out     Today's visit:   Subjective:  Had been feeling great - no afib for 16 months; occasional PAC  Had covid 9/16; had flu vaccine; started Biotin and CO-Q10  Has apple watch - has noted higher heart rates  Stopped drinking decaf coffee  No real differences  Wondering about thyroid function  Sometimes heart pounding with HR up - min later down in 70's  No chest pain or pressure  No lightheadedness or dizziness; no syncope or near syncope  No swelling  No changes in breathing - even with episodes of afib/pacs    Subjective:  Review of Systems  General:   No fever, no chills, no fatigue or weight loss  Pulmonary:    No dyspnea, no wheezing  Cardiac:    Denies recent chest pain, very few palpitations  GI:     No nausea or vomiting, no abdominal pain  Neuro:      No dizziness or light headedness, trouble sleeping  Musculoskeletal:  No recent active issues  Extremities:   No edema, no obvious claudication       Objective:  Physical Exam - neg  BP (!) 140/71   Pulse 74   Ht 5' 6\" (1.676 m)   Wt 200 lb (90.7 kg)   BMI 32.28 kg/m²     General:   Well developed, well nourished, pleasant, talkative, no distress, appears well  Lungs:    Clear to auscultation  Heart:    Normal S1 S2,  regular rhythm, normal rate. Slight murmur. no rubs, no gallops  Abdomen:   Soft, non tender, no organomegalies, positive bowel sounds  Extremities:   No edema, no cyanosis, good peripheral pulses  Neurological:   Awake, alert, oriented.  No obvious focal deficits  Musculoskelatal:  No obvious deformities, some low back pain and sciatica    Labs:    TSH with Reflex to FT4  Order: [id number]  Status: Edited Result - FINAL    Visible to patient: Yes (seen)    Next appt: 2023 at 09:00 AM in Family Medicine Kirill Pastor MD)    0 Result Notes  Component Ref Range & Units 22 0832 22 0814 3/29/22 0824 21 0727 21 0808 21 1858 21 1502   TSH 0.400 - 4.10 uIu/mL 3.39  2.400  3.40 R, CM  4.670 High  R, CM  4.270 High  R, CM  0.821 R, CM  1.290 R, CM    Comment: Pathology [facility], [address] [place] [address]   CLIA No. [id number]   CAP Accreditation No. [id number]   : Marilin Weber. Rebecca Haji M.D. [address] [facility]           Narrative  Performed by: [facility]  Ordering Provider: JORDAN MARTIN      Specimen Collected: 22 08:32 EST Last Resulted: 22 00           Lipid Panel w/ Reflex Direct LDL  Order: [id number]  Status: Edited Result - FINAL    Visible to patient: Yes (seen)    Next appt: Today at 08:30 AM in Cardiology Leslie Martin, APRN - CNP)    2 Result Notes    1 HM Topic  Component Ref Range & Units 22 0814 21 0808 20 0635 19 0536 12 0359 11 0925   Cholesterol <200 mg/dL 151         Triglycerides <149 mg/dL 93  100 R, CM  104 R, CM   108 R, CM  164 R, CM    HDL >39 mg/dL 42  42 R, CM  41 R, CM  41 R, CM  36 R, CM  44 R, CM    LDL Calculated <100 mg/dL 90  73 R, CM  75 R, CM  47 R, CM  92 R, CM  114 R, CM    VLDL Cholesterol Calculated <30 mg/dL 19         LDL/HDL Ratio <3.22 RATIO 2.1         Chol/HDL Ratio <4.44 RATIO 3.6         Cholesterol, Total   135 R, CM  137 R, CM   150 R, CM  191 R, CM    Cholesterol, Fasting     105 R, CM      Triglyceride, Fasting     85 R, CM           Diagnostics:  EKG 2022  Sinus tach of 105/min. No acute findings.     Event monitor: 2021   EVENT MONITOR     PATIENT NAME: [facility]                   : 1950  MED REC NO:   [id number]                           ROOM:  HUGO NO:   [de-identified]                           ADMIT DATE: 06/23/2021  PROVIDER:     Mark Alaniz M.D. CLINICAL HISTORY AND INDICATION:  This is a patient with palpitation. EVENT MONITOR DESCRIPTION:  Event monitor was attached to the patient  between 06/23/2021 and 07/07/2021. EVENT MONITOR FINDINGS:  Baseline rhythm showed sinus rhythm with short  intermittent episodes of narrow complex tachyarrhythmia, likely atrial  fibrillation of several beats. No sustained AFib was noted. No V-tach  and no pauses. CONCLUSION:  1. Sinus rhythm. 2.  Short nonsustained episodes of atrial fibrillation, rapid  ventricular response at several beats at a time were noted on different  occasions. 3.  Clinical correlation is recommended. Елена Fagan M.D.   D: 07/14/2021    Echo: 5/10/2021  Indications:Chest pain. Additional Medical History:Hyperlipidemia, Coronary artery disease, Chest  Pain, Diabetic, Shortness of breath. Conclusions      Summary   Technically difficult examination. Ejection fraction is visually estimated at 60%. Overall left ventricular function is normal.   The aortic valve leaflets were not well visualized. Aortic valve appears tricuspid. Aortic valve leaflets are somewhat thickened. Aortic valve leaflets are Mildly calcified. Signature   Electronically signed by Javier Melchor MD (Interpreting   physician) on 05/10/2021 at 04:05 PM      5/10/2021: Lucerosabi Trammell stress test.  Summary   This Nuclear Medicine study was negative for ischemia . normal EF      Recommendation   Medical management. Signatures    ----------------------------------------------------------------   Electronically signed by Javier Melchor MD (Interpreting   Cardiologist) on 05/10/2021 at 16:32      Assessment:      Diagnosis Orders   1. Paroxysmal atrial fibrillation (HCC)        2.  Primary hypertension 3. Familial hypercholesterolemia        4. Other specified hypothyroidism  TSH With Reflex Ft4      as above  No episodes of afib for ~ 16 months as far as she can tell  HR seems to go up with activity but then does come down readily; aSx  Had started taking Biotin as supplement - read later it can interfere with thyroid function - requesting thyroid level  Tolerating meds; no bleeding on Eliquis  No anginal sx or evidence of decompensated HF  Plan:  Return in about 5 months (around 4/24/2023), or if symptoms worsen or fail to improve, for Dr. Samy Valdivia. As above  Continue risk factor modification and medical management    Stop the Biotin for now. Recheck thyroid function in a couple weeks. Continue to take the Toprol XL as a tablet twice a day as you have been. Continue other current medications as prescribed. Stay active; may exercise/ride bike. May use low dose Ibuprofen (200 mg) every 6 to 12 hours for short periods of time (3 days) and always with food if need to control back pain and sciatica. Stay well hydrated. Use ice/heat as needed. May try Melatonin for sleep; may take 2 - 10 mg; may repeat dose at 1/2 original dose or full dose again if need. Follow-up with your PCP as scheduled. Follow-up with Dr. Samy Valdivia in April as scheduled or sooner if need. Thank you for allowing me to participate in the care of your patient. Please don't hesitate to contact me regarding any further issues related to the patient care    Orders Placed:  Orders Placed This Encounter   Procedures    TSH With Reflex Ft4     Standing Status:   Future     Standing Expiration Date:   11/8/2023    TSH with Reflex to FT4         Medications Prescribed:  No orders of the defined types were placed in this encounter. Discussed use, benefit, and side effects of prescribed medications. All patient questions answered. Pt voicedunderstanding. Instructed to continue current medications, diet and exercise. Continue risk factor modification and medical management. Patient agreed with treatment plan. Follow up as directed.     Electronically signedby JULIANNE Ayala CNP on 12/7/2022 at 5:39 AM

## 2022-11-08 NOTE — PATIENT INSTRUCTIONS
Stop the Biotin for now. Recheck thyroid function in a couple weeks. Continue to take the Toprol XL as a tablet twice a day as you have been. Continue other current medications as prescribed. Stay active; may exercise/ride bike. May use low dose Ibuprofen (200 mg) every 6 to 12 hours for short periods of time (3 days) and always with food if need to control back pain and sciatica. Stay well hydrated. Use ice/heat as needed. May try Melatonin for sleep; may take 2 - 10 mg; may repeat dose at 1/2 original dose or full dose again if need. Follow-up with your PCP as scheduled. Follow-up with Dr. Gretchen Price in April as scheduled or sooner if need.

## 2022-11-18 LAB — TSH SERPL DL<=0.05 MIU/L-ACNC: 3.39 UIU/ML (ref 0.4–4.1)

## 2023-04-23 ENCOUNTER — PATIENT MESSAGE (OUTPATIENT)
Dept: FAMILY MEDICINE CLINIC | Age: 73
End: 2023-04-23

## 2023-04-24 ENCOUNTER — OFFICE VISIT (OUTPATIENT)
Dept: FAMILY MEDICINE CLINIC | Age: 73
End: 2023-04-24
Payer: MEDICARE

## 2023-04-24 ENCOUNTER — HOSPITAL ENCOUNTER (OUTPATIENT)
Dept: GENERAL RADIOLOGY | Age: 73
Discharge: HOME | End: 2023-04-24
Payer: MEDICARE

## 2023-04-24 ENCOUNTER — HOSPITAL ENCOUNTER (OUTPATIENT)
Age: 73
Discharge: HOME | End: 2023-04-24
Payer: MEDICARE

## 2023-04-24 ENCOUNTER — OFFICE VISIT (OUTPATIENT)
Dept: CARDIOLOGY CLINIC | Age: 73
End: 2023-04-24
Payer: MEDICARE

## 2023-04-24 VITALS
HEIGHT: 66 IN | BODY MASS INDEX: 32.3 KG/M2 | TEMPERATURE: 97.5 F | OXYGEN SATURATION: 98 % | RESPIRATION RATE: 16 BRPM | SYSTOLIC BLOOD PRESSURE: 132 MMHG | HEART RATE: 70 BPM | DIASTOLIC BLOOD PRESSURE: 82 MMHG

## 2023-04-24 VITALS
SYSTOLIC BLOOD PRESSURE: 126 MMHG | DIASTOLIC BLOOD PRESSURE: 82 MMHG | HEART RATE: 89 BPM | HEIGHT: 66 IN | BODY MASS INDEX: 32.28 KG/M2

## 2023-04-24 DIAGNOSIS — R10.9 FLANK PAIN: ICD-10-CM

## 2023-04-24 DIAGNOSIS — I10 PRIMARY HYPERTENSION: ICD-10-CM

## 2023-04-24 DIAGNOSIS — I48.0 PAROXYSMAL ATRIAL FIBRILLATION (HCC): Primary | ICD-10-CM

## 2023-04-24 DIAGNOSIS — M54.50 ACUTE BILATERAL LOW BACK PAIN WITHOUT SCIATICA: ICD-10-CM

## 2023-04-24 DIAGNOSIS — R10.9 FLANK PAIN: Primary | ICD-10-CM

## 2023-04-24 LAB
BILIRUBIN, POC: NEGATIVE
BLOOD URINE, POC: NEGATIVE
CLARITY, POC: CLEAR
COLOR, POC: YELLOW
GLUCOSE URINE, POC: NEGATIVE
KETONES, POC: NEGATIVE
LEUKOCYTE EST, POC: NORMAL
NITRITE, POC: NEGATIVE
PH, POC: 7
PROTEIN, POC: NEGATIVE
SPECIFIC GRAVITY, POC: 1.01
UROBILINOGEN, POC: 0.2

## 2023-04-24 PROCEDURE — 1123F ACP DISCUSS/DSCN MKR DOCD: CPT | Performed by: NUCLEAR MEDICINE

## 2023-04-24 PROCEDURE — G8400 PT W/DXA NO RESULTS DOC: HCPCS | Performed by: FAMILY MEDICINE

## 2023-04-24 PROCEDURE — G8417 CALC BMI ABV UP PARAM F/U: HCPCS | Performed by: FAMILY MEDICINE

## 2023-04-24 PROCEDURE — 99213 OFFICE O/P EST LOW 20 MIN: CPT | Performed by: NUCLEAR MEDICINE

## 2023-04-24 PROCEDURE — 3017F COLORECTAL CA SCREEN DOC REV: CPT | Performed by: FAMILY MEDICINE

## 2023-04-24 PROCEDURE — 1036F TOBACCO NON-USER: CPT | Performed by: NUCLEAR MEDICINE

## 2023-04-24 PROCEDURE — 1090F PRES/ABSN URINE INCON ASSESS: CPT | Performed by: NUCLEAR MEDICINE

## 2023-04-24 PROCEDURE — G8417 CALC BMI ABV UP PARAM F/U: HCPCS | Performed by: NUCLEAR MEDICINE

## 2023-04-24 PROCEDURE — 3017F COLORECTAL CA SCREEN DOC REV: CPT | Performed by: NUCLEAR MEDICINE

## 2023-04-24 PROCEDURE — 1036F TOBACCO NON-USER: CPT | Performed by: FAMILY MEDICINE

## 2023-04-24 PROCEDURE — 3079F DIAST BP 80-89 MM HG: CPT | Performed by: NUCLEAR MEDICINE

## 2023-04-24 PROCEDURE — G8427 DOCREV CUR MEDS BY ELIG CLIN: HCPCS | Performed by: FAMILY MEDICINE

## 2023-04-24 PROCEDURE — 93000 ELECTROCARDIOGRAM COMPLETE: CPT | Performed by: NUCLEAR MEDICINE

## 2023-04-24 PROCEDURE — 74018 RADEX ABDOMEN 1 VIEW: CPT

## 2023-04-24 PROCEDURE — G8400 PT W/DXA NO RESULTS DOC: HCPCS | Performed by: NUCLEAR MEDICINE

## 2023-04-24 PROCEDURE — 1090F PRES/ABSN URINE INCON ASSESS: CPT | Performed by: FAMILY MEDICINE

## 2023-04-24 PROCEDURE — 3074F SYST BP LT 130 MM HG: CPT | Performed by: NUCLEAR MEDICINE

## 2023-04-24 PROCEDURE — 72100 X-RAY EXAM L-S SPINE 2/3 VWS: CPT

## 2023-04-24 PROCEDURE — 1123F ACP DISCUSS/DSCN MKR DOCD: CPT | Performed by: FAMILY MEDICINE

## 2023-04-24 PROCEDURE — 99214 OFFICE O/P EST MOD 30 MIN: CPT | Performed by: FAMILY MEDICINE

## 2023-04-24 PROCEDURE — 81003 URINALYSIS AUTO W/O SCOPE: CPT | Performed by: FAMILY MEDICINE

## 2023-04-24 PROCEDURE — G8428 CUR MEDS NOT DOCUMENT: HCPCS | Performed by: NUCLEAR MEDICINE

## 2023-04-24 RX ORDER — TIZANIDINE 4 MG/1
4 TABLET ORAL 3 TIMES DAILY PRN
Qty: 30 TABLET | Refills: 2 | Status: SHIPPED | OUTPATIENT
Start: 2023-04-24

## 2023-04-24 SDOH — ECONOMIC STABILITY: HOUSING INSECURITY
IN THE LAST 12 MONTHS, WAS THERE A TIME WHEN YOU DID NOT HAVE A STEADY PLACE TO SLEEP OR SLEPT IN A SHELTER (INCLUDING NOW)?: NO

## 2023-04-24 SDOH — ECONOMIC STABILITY: INCOME INSECURITY: HOW HARD IS IT FOR YOU TO PAY FOR THE VERY BASICS LIKE FOOD, HOUSING, MEDICAL CARE, AND HEATING?: NOT VERY HARD

## 2023-04-24 SDOH — ECONOMIC STABILITY: FOOD INSECURITY: WITHIN THE PAST 12 MONTHS, YOU WORRIED THAT YOUR FOOD WOULD RUN OUT BEFORE YOU GOT MONEY TO BUY MORE.: NEVER TRUE

## 2023-04-24 SDOH — ECONOMIC STABILITY: FOOD INSECURITY: WITHIN THE PAST 12 MONTHS, THE FOOD YOU BOUGHT JUST DIDN'T LAST AND YOU DIDN'T HAVE MONEY TO GET MORE.: NEVER TRUE

## 2023-04-24 ASSESSMENT — ENCOUNTER SYMPTOMS
ABDOMINAL PAIN: 0
COUGH: 0
DIARRHEA: 0
SORE THROAT: 0
CONSTIPATION: 0
BOWEL INCONTINENCE: 0
WHEEZING: 0
RHINORRHEA: 0
SHORTNESS OF BREATH: 0
BACK PAIN: 1
NAUSEA: 0

## 2023-04-24 ASSESSMENT — PATIENT HEALTH QUESTIONNAIRE - PHQ9
SUM OF ALL RESPONSES TO PHQ QUESTIONS 1-9: 0
1. LITTLE INTEREST OR PLEASURE IN DOING THINGS: 0
SUM OF ALL RESPONSES TO PHQ QUESTIONS 1-9: 0
2. FEELING DOWN, DEPRESSED OR HOPELESS: 0
SUM OF ALL RESPONSES TO PHQ9 QUESTIONS 1 & 2: 0

## 2023-04-24 NOTE — PROGRESS NOTES
[address] [facility] .  [address] [address]  Dept: [phone]  Dept Fax: [phone]  Loc: [phone]    Visit Date: 2023    Kaleta Halsted is a 71 y.o. female who presents todayfor:  Chief Complaint   Patient presents with    Atrial Fibrillation    Hyperlipidemia   Known A fib   As well hyperlipidemia  No chest pain   No changes in breathing  No bleeding         HPI:  HPI  Past Medical History:   Diagnosis Date    Hypothyroidism     Insulin resistance     Pancreatitis     r/t Gall bladder      Past Surgical History:   Procedure Laterality Date    BREAST BIOPSY Left     benign     SECTION      CHOLECYSTECTOMY      HYSTERECTOMY (CERVIX STATUS UNKNOWN)  1985    OVARY REMOVAL Bilateral     [place] BX OF BREAST, NEEDLE CORE, IMAGE GUIDE Left     benign    TONSILLECTOMY       Family History   Problem Relation Age of Onset    Diabetes Mother     Heart Disease Mother     Cancer Mother     Breast Cancer Mother 79    Heart Disease Father     Cancer Father         bone    Heart Disease Paternal Uncle     Heart Disease Paternal Grandmother     Heart Disease Paternal Grandfather      Social History     Tobacco Use    Smoking status: Never    Smokeless tobacco: Never   Substance Use Topics    Alcohol use: No      Current Outpatient Medications   Medication Sig Dispense Refill    thyroid (NP THYROID) 60 MG tablet 60 mg PO daily Monday- Saturday 77 tablet 3    thyroid (NP THYROID) 90 MG tablet Take 1 tablet by mouth once a week On  13 tablet 3    metoprolol succinate (TOPROL XL) 25 MG extended release tablet Take 1 tablet by mouth 2 times daily 180 tablet 3    apixaban (ELIQUIS) 5 MG TABS tablet Take 1 tablet by mouth 2 times daily 180 tablet 3    atorvastatin (LIPITOR) 20 MG tablet TAKE 1 TABLET DAILY 90 tablet 3    acetaminophen (TYLENOL) 650 MG extended release tablet Take 1 tablet by mouth every 8 hours as needed for

## 2023-04-24 NOTE — TELEPHONE ENCOUNTER
please schedule appt to discuss these issues. This is more than can be addressed properly over [place].  thanks

## 2023-04-24 NOTE — PROGRESS NOTES
[address] [place] [address] [address] [place] [address]  Dept: [phone]  Loc: [facility] (:  1950) is a 71 y.o. female, here for evaluation of the following chief complaint(s):  Back Pain (Left lumbar flank area, does not radiate down leg at this time. Has been having some urinary frequency the lat few days,)      ASSESSMENT/PLAN:  1. Flank pain  -     POCT Urinalysis No Micro (Auto)  -     XR ABDOMEN (KUB) (SINGLE AP VIEW); Future  -     XR LUMBAR SPINE (2-3 VIEWS); Future  2. Acute bilateral low back pain without sciatica  -     XR LUMBAR SPINE (2-3 VIEWS); Future  -     tiZANidine (ZANAFLEX) 4 MG tablet; Take 1 tablet by mouth 3 times daily as needed (muscle spasm), Disp-30 tablet, R-2Normal  UA not suggestive of UTI but will send to cx  Ibuprofen prn  Push fluids  Will get KUB and lumbar xray  Diff includes renal stone, DDD, muscle spasm    No follow-ups on file. SUBJECTIVE/OBJECTIVE:  Back Pain  This is a recurrent problem. The current episode started more than 1 month ago. The problem occurs intermittently. The problem has been gradually worsening since onset. The pain is present in the lumbar spine. The quality of the pain is described as aching. The pain does not radiate. The pain is moderate. The pain is Worse during the day. The symptoms are aggravated by position and standing. Stiffness is present In the morning. Pertinent negatives include no abdominal pain, bladder incontinence, bowel incontinence, chest pain, dysuria, fever, headaches, leg pain, numbness, paresis, paresthesias, pelvic pain, perianal numbness, tingling, weakness or weight loss. Risk factors include menopause. She has tried NSAIDs for the symptoms. The treatment provided moderate relief. Review of Systems   Constitutional:  Positive for activity change. Negative for chills, fatigue, fever and weight loss.    HENT:  Negative for

## 2023-05-08 ENCOUNTER — PATIENT MESSAGE (OUTPATIENT)
Dept: FAMILY MEDICINE CLINIC | Age: 73
End: 2023-05-08

## 2023-05-08 DIAGNOSIS — E03.9 ACQUIRED HYPOTHYROIDISM: ICD-10-CM

## 2023-05-08 RX ORDER — LEVOTHYROXINE AND LIOTHYRONINE 38; 9 UG/1; UG/1
TABLET ORAL
Qty: 78 TABLET | Refills: 3 | Status: SHIPPED | OUTPATIENT
Start: 2023-05-08

## 2023-05-08 NOTE — TELEPHONE ENCOUNTER
From: Bulmaro Justice  To: Dr. Erasto Moore: 5/8/2023 3:27 PM EDT  Subject: prescription    I need a new [facility]. [facility] a [facility]. Maybe it didnt transfer from previous [facility]        need  Thyroid 60 mg 1 tab every day , 6 days a week   90 day supply = #78    I have enough refills of 90 mg.   thank you  stephanie alarcon

## 2023-08-14 ENCOUNTER — TELEPHONE (OUTPATIENT)
Dept: PHARMACY | Facility: CLINIC | Age: 73
End: 2023-08-14

## 2023-08-14 NOTE — TELEPHONE ENCOUNTER
[facility] CLINICAL PHARMACY: ADHERENCE REVIEW  Identified care gap per [employer]: fills at [facility]: Statin adherence    ASSESSMENT  [address] Records claims through 07/24/2023 (Prior Year [address] = not reported; YTD [address] = FIRST FILL; Potential Fail Date: 8/21/23): Atorvastatin 20 mg tablets last filled on 3/29/23 for 90 day supply. Next refill due: 6/27/23    Per chart review, likely refills remaining    Lab Results   Component Value Date    CHOL 151 09/20/2022    TRIG 93 09/20/2022    HDL 42 09/20/2022    LDLCALC 90 09/20/2022     ALT   Date Value Ref Range Status   09/20/2022 37 5 - 40 U/L Final     AST   Date Value Ref Range Status   09/20/2022 28 9 - 40 U/L Final     The 10-year ASCVD risk score (Jose MALHOTRA, et al., 2019) is: 19.9%    Values used to calculate the score:      Age: 71 years      Sex: Female      Is Non- : No      Diabetic: Yes      Tobacco smoker: No      Systolic Blood Pressure: 048 mmHg      Is BP treated: No      HDL Cholesterol: 42 mg/dL      Total Cholesterol: 151 mg/dL     PLAN  The following are interventions that have been identified:   Patient overdue refilling atorvastatin and active on home medication list.     Reached patient to review. She confirms still taking atorvastatin. Denies trouble or side effects with the medication. States she had some remaining from previous fills was using up and is starting to run low. She will contact [facility] for refill when needed. No further outreach planned at this time.      Last Visit: 4/24/23  Future Appointments   Date Time Provider [address]   8/16/2023  8:00 AM STR PCACC MAMMOGRAPHY RM1 STRZ PUT OP STR Doni    9/20/2023  9:00 AM Kenji Minor MD Fam Med CG P - David Fluke   4/29/2024  9:30 AM Gela Alexander MD N SRPX Heart P - Early Rosa Sanchez, PharmD, [facility], [address] [facility]  Department, toll free: [phone], option 1    For

## 2023-08-16 ENCOUNTER — HOSPITAL ENCOUNTER (OUTPATIENT)
Dept: MAMMOGRAPHY | Age: 73
Discharge: HOME | End: 2023-08-16
Payer: MEDICARE

## 2023-08-16 VITALS — HEIGHT: 66 IN | WEIGHT: 200 LBS | BODY MASS INDEX: 32.14 KG/M2

## 2023-08-16 DIAGNOSIS — Z12.39 BREAST SCREENING: ICD-10-CM

## 2023-08-16 PROCEDURE — 77063 BREAST TOMOSYNTHESIS BI: CPT

## 2023-09-17 SDOH — HEALTH STABILITY: PHYSICAL HEALTH: ON AVERAGE, HOW MANY DAYS PER WEEK DO YOU ENGAGE IN MODERATE TO STRENUOUS EXERCISE (LIKE A BRISK WALK)?: 3 DAYS

## 2023-09-17 SDOH — HEALTH STABILITY: PHYSICAL HEALTH: ON AVERAGE, HOW MANY MINUTES DO YOU ENGAGE IN EXERCISE AT THIS LEVEL?: 30 MIN

## 2023-09-17 ASSESSMENT — PATIENT HEALTH QUESTIONNAIRE - PHQ9
SUM OF ALL RESPONSES TO PHQ9 QUESTIONS 1 & 2: 0
SUM OF ALL RESPONSES TO PHQ QUESTIONS 1-9: 0
1. LITTLE INTEREST OR PLEASURE IN DOING THINGS: 0
2. FEELING DOWN, DEPRESSED OR HOPELESS: 0
SUM OF ALL RESPONSES TO PHQ QUESTIONS 1-9: 0

## 2023-09-17 ASSESSMENT — LIFESTYLE VARIABLES
HOW OFTEN DO YOU HAVE A DRINK CONTAINING ALCOHOL: NEVER
HOW MANY STANDARD DRINKS CONTAINING ALCOHOL DO YOU HAVE ON A TYPICAL DAY: 0
HOW OFTEN DO YOU HAVE SIX OR MORE DRINKS ON ONE OCCASION: 1
HOW OFTEN DO YOU HAVE A DRINK CONTAINING ALCOHOL: 1
HOW MANY STANDARD DRINKS CONTAINING ALCOHOL DO YOU HAVE ON A TYPICAL DAY: PATIENT DOES NOT DRINK

## 2023-09-20 ENCOUNTER — OFFICE VISIT (OUTPATIENT)
Dept: FAMILY MEDICINE CLINIC | Age: 73
End: 2023-09-20
Payer: MEDICARE

## 2023-09-20 VITALS
SYSTOLIC BLOOD PRESSURE: 138 MMHG | HEART RATE: 78 BPM | DIASTOLIC BLOOD PRESSURE: 80 MMHG | RESPIRATION RATE: 16 BRPM | WEIGHT: 210 LBS | BODY MASS INDEX: 33.75 KG/M2 | HEIGHT: 66 IN | OXYGEN SATURATION: 95 %

## 2023-09-20 DIAGNOSIS — Z00.00 MEDICARE ANNUAL WELLNESS VISIT, SUBSEQUENT: Primary | ICD-10-CM

## 2023-09-20 DIAGNOSIS — I10 ESSENTIAL HYPERTENSION: ICD-10-CM

## 2023-09-20 DIAGNOSIS — E11.65 TYPE 2 DIABETES MELLITUS WITH HYPERGLYCEMIA, WITHOUT LONG-TERM CURRENT USE OF INSULIN (HCC): ICD-10-CM

## 2023-09-20 DIAGNOSIS — E03.9 ACQUIRED HYPOTHYROIDISM: ICD-10-CM

## 2023-09-20 DIAGNOSIS — Z78.0 ASYMPTOMATIC MENOPAUSAL STATE: ICD-10-CM

## 2023-09-20 DIAGNOSIS — N76.1 SUBACUTE VAGINITIS: ICD-10-CM

## 2023-09-20 DIAGNOSIS — Z23 NEED FOR VACCINATION: ICD-10-CM

## 2023-09-20 LAB
CREATININE URINE POCT: 200
MICROALBUMIN/CREAT 24H UR: 30 MG/G{CREAT}
MICROALBUMIN/CREAT UR-RTO: <30

## 2023-09-20 PROCEDURE — 3017F COLORECTAL CA SCREEN DOC REV: CPT | Performed by: FAMILY MEDICINE

## 2023-09-20 PROCEDURE — 1090F PRES/ABSN URINE INCON ASSESS: CPT | Performed by: FAMILY MEDICINE

## 2023-09-20 PROCEDURE — G8400 PT W/DXA NO RESULTS DOC: HCPCS | Performed by: FAMILY MEDICINE

## 2023-09-20 PROCEDURE — 3046F HEMOGLOBIN A1C LEVEL >9.0%: CPT | Performed by: FAMILY MEDICINE

## 2023-09-20 PROCEDURE — G8417 CALC BMI ABV UP PARAM F/U: HCPCS | Performed by: FAMILY MEDICINE

## 2023-09-20 PROCEDURE — 1036F TOBACCO NON-USER: CPT | Performed by: FAMILY MEDICINE

## 2023-09-20 PROCEDURE — 3079F DIAST BP 80-89 MM HG: CPT | Performed by: FAMILY MEDICINE

## 2023-09-20 PROCEDURE — 99213 OFFICE O/P EST LOW 20 MIN: CPT | Performed by: FAMILY MEDICINE

## 2023-09-20 PROCEDURE — 90677 PCV20 VACCINE IM: CPT | Performed by: FAMILY MEDICINE

## 2023-09-20 PROCEDURE — G0009 ADMIN PNEUMOCOCCAL VACCINE: HCPCS | Performed by: FAMILY MEDICINE

## 2023-09-20 PROCEDURE — 3075F SYST BP GE 130 - 139MM HG: CPT | Performed by: FAMILY MEDICINE

## 2023-09-20 PROCEDURE — G8427 DOCREV CUR MEDS BY ELIG CLIN: HCPCS | Performed by: FAMILY MEDICINE

## 2023-09-20 PROCEDURE — 82044 UR ALBUMIN SEMIQUANTITATIVE: CPT | Performed by: FAMILY MEDICINE

## 2023-09-20 PROCEDURE — 2022F DILAT RTA XM EVC RTNOPTHY: CPT | Performed by: FAMILY MEDICINE

## 2023-09-20 PROCEDURE — 1123F ACP DISCUSS/DSCN MKR DOCD: CPT | Performed by: FAMILY MEDICINE

## 2023-09-20 PROCEDURE — G0439 PPPS, SUBSEQ VISIT: HCPCS | Performed by: FAMILY MEDICINE

## 2023-09-20 RX ORDER — ATORVASTATIN CALCIUM 20 MG/1
20 TABLET, FILM COATED ORAL DAILY
Qty: 90 TABLET | Refills: 3 | Status: SHIPPED | OUTPATIENT
Start: 2023-09-20

## 2023-09-20 RX ORDER — THYROID 90 MG/1
90 TABLET ORAL WEEKLY
Qty: 13 TABLET | Refills: 3 | Status: SHIPPED | OUTPATIENT
Start: 2023-09-20

## 2023-09-20 RX ORDER — ESTRADIOL 0.1 MG/G
CREAM VAGINAL
COMMUNITY
Start: 2023-09-07

## 2023-09-20 NOTE — PROGRESS NOTES
Immunizations Administered       Name Date Dose Route    Pneumococcal, PCV20, PREVNAR 21, (age 18y+), IM, 0.5mL 9/20/2023 0.5 mL Intramuscular    Site: Deltoid- Left    Lot: [id number]    NDC: 2805-1302-96
Provider  Carol Gan MD as Cardiologist (Cardiology)     Reviewed and updated this visit:  Tobacco  Allergies  Meds  Med Hx  Surg Hx  Soc Hx  Fam Hx         Electronically signed by Haley Currie MD on 9/20/2023 at 10:16 AM

## 2023-09-21 ENCOUNTER — NURSE ONLY (OUTPATIENT)
Dept: LAB | Age: 73
End: 2023-09-21

## 2023-09-21 DIAGNOSIS — E11.65 TYPE 2 DIABETES MELLITUS WITH HYPERGLYCEMIA, WITHOUT LONG-TERM CURRENT USE OF INSULIN (HCC): ICD-10-CM

## 2023-09-21 DIAGNOSIS — E03.9 ACQUIRED HYPOTHYROIDISM: ICD-10-CM

## 2023-09-21 LAB
ALBUMIN SERPL BCG-MCNC: 3.9 G/DL (ref 3.5–5.1)
ALP SERPL-CCNC: 101 U/L (ref 38–126)
ALT SERPL W/O P-5'-P-CCNC: 54 U/L (ref 11–66)
ANION GAP SERPL CALC-SCNC: 10 MEQ/L (ref 8–16)
AST SERPL-CCNC: 35 U/L (ref 5–40)
BASOPHILS ABSOLUTE: 0.1 THOU/MM3 (ref 0–0.1)
BASOPHILS NFR BLD AUTO: 1.2 %
BILIRUB SERPL-MCNC: 1.2 MG/DL (ref 0.3–1.2)
BUN SERPL-MCNC: 12 MG/DL (ref 7–22)
CALCIUM SERPL-MCNC: 9.1 MG/DL (ref 8.5–10.5)
CANDIDA SPECIES, DNA PROBE: NEGATIVE
CHLORIDE SERPL-SCNC: 105 MEQ/L (ref 98–111)
CHOLEST SERPL-MCNC: 165 MG/DL (ref 100–199)
CO2 SERPL-SCNC: 28 MEQ/L (ref 23–33)
CREAT SERPL-MCNC: 0.8 MG/DL (ref 0.4–1.2)
DEPRECATED MEAN GLUCOSE BLD GHB EST-ACNC: 132 MG/DL (ref 70–126)
DEPRECATED RDW RBC AUTO: 45.9 FL (ref 35–45)
EOSINOPHIL NFR BLD AUTO: 2.9 %
EOSINOPHILS ABSOLUTE: 0.2 THOU/MM3 (ref 0–0.4)
ERYTHROCYTE [DISTWIDTH] IN BLOOD BY AUTOMATED COUNT: 13.6 % (ref 11.5–14.5)
GARDNERELLA VAGINALIS, DNA PROBE: NEGATIVE
GFR SERPL CREATININE-BSD FRML MDRD: > 60 ML/MIN/1.73M2
GLUCOSE SERPL-MCNC: 128 MG/DL (ref 70–108)
HBA1C MFR BLD HPLC: 6.4 % (ref 4.4–6.4)
HCT VFR BLD AUTO: 41.2 % (ref 37–47)
HDLC SERPL-MCNC: 51 MG/DL
HGB BLD-MCNC: 13.4 GM/DL (ref 12–16)
IMM GRANULOCYTES # BLD AUTO: 0.03 THOU/MM3 (ref 0–0.07)
IMM GRANULOCYTES NFR BLD AUTO: 0.5 %
LDLC SERPL CALC-MCNC: 92 MG/DL
LYMPHOCYTES ABSOLUTE: 1.1 THOU/MM3 (ref 1–4.8)
LYMPHOCYTES NFR BLD AUTO: 19.6 %
MCH RBC QN AUTO: 30 PG (ref 26–33)
MCHC RBC AUTO-ENTMCNC: 32.5 GM/DL (ref 32.2–35.5)
MCV RBC AUTO: 92.4 FL (ref 81–99)
MONOCYTES ABSOLUTE: 0.5 THOU/MM3 (ref 0.4–1.3)
MONOCYTES NFR BLD AUTO: 7.9 %
NEUTROPHILS NFR BLD AUTO: 67.9 %
NRBC BLD AUTO-RTO: 0 /100 WBC
PLATELET # BLD AUTO: 157 THOU/MM3 (ref 130–400)
PMV BLD AUTO: 11.8 FL (ref 9.4–12.4)
POTASSIUM SERPL-SCNC: 4 MEQ/L (ref 3.5–5.2)
PROT SERPL-MCNC: 6.6 G/DL (ref 6.1–8)
RBC # BLD AUTO: 4.46 MILL/MM3 (ref 4.2–5.4)
SCAN OF BLOOD SMEAR: NORMAL
SEGMENTED NEUTROPHILS ABSOLUTE COUNT: 3.9 THOU/MM3 (ref 1.8–7.7)
SODIUM SERPL-SCNC: 143 MEQ/L (ref 135–145)
SOURCE: NORMAL
TRICHOMONAS VAGINALIS DNA: NEGATIVE
TRIGL SERPL-MCNC: 108 MG/DL (ref 0–199)
TSH SERPL DL<=0.005 MIU/L-ACNC: 3.94 UIU/ML (ref 0.4–4.2)
WBC # BLD AUTO: 5.8 THOU/MM3 (ref 4.8–10.8)

## 2023-10-17 ENCOUNTER — HOSPITAL ENCOUNTER (OUTPATIENT)
Dept: WOMENS IMAGING | Age: 73
Discharge: HOME | End: 2023-10-17
Attending: FAMILY MEDICINE
Payer: MEDICARE

## 2023-10-17 DIAGNOSIS — Z78.0 ASYMPTOMATIC MENOPAUSAL STATE: ICD-10-CM

## 2023-10-17 PROCEDURE — 77080 DXA BONE DENSITY AXIAL: CPT

## 2023-10-18 ENCOUNTER — TELEPHONE (OUTPATIENT)
Dept: FAMILY MEDICINE CLINIC | Age: 73
End: 2023-10-18

## 2023-10-18 NOTE — TELEPHONE ENCOUNTER
----- Message from Polo Kayser, MD sent at 10/18/2023  9:07 AM EDT -----  Please advise patient that results are normal. Thanks.

## 2023-10-26 ENCOUNTER — OFFICE VISIT (OUTPATIENT)
Dept: FAMILY MEDICINE CLINIC | Age: 73
End: 2023-10-26
Payer: MEDICARE

## 2023-10-26 VITALS
HEART RATE: 82 BPM | WEIGHT: 211 LBS | RESPIRATION RATE: 16 BRPM | OXYGEN SATURATION: 96 % | BODY MASS INDEX: 33.91 KG/M2 | DIASTOLIC BLOOD PRESSURE: 80 MMHG | SYSTOLIC BLOOD PRESSURE: 136 MMHG | TEMPERATURE: 97.1 F | HEIGHT: 66 IN

## 2023-10-26 DIAGNOSIS — B37.0 ORAL THRUSH: Primary | ICD-10-CM

## 2023-10-26 PROCEDURE — 1123F ACP DISCUSS/DSCN MKR DOCD: CPT | Performed by: FAMILY MEDICINE

## 2023-10-26 PROCEDURE — G8399 PT W/DXA RESULTS DOCUMENT: HCPCS | Performed by: FAMILY MEDICINE

## 2023-10-26 PROCEDURE — 99213 OFFICE O/P EST LOW 20 MIN: CPT | Performed by: FAMILY MEDICINE

## 2023-10-26 PROCEDURE — 3017F COLORECTAL CA SCREEN DOC REV: CPT | Performed by: FAMILY MEDICINE

## 2023-10-26 PROCEDURE — 1090F PRES/ABSN URINE INCON ASSESS: CPT | Performed by: FAMILY MEDICINE

## 2023-10-26 PROCEDURE — 1036F TOBACCO NON-USER: CPT | Performed by: FAMILY MEDICINE

## 2023-10-26 PROCEDURE — G8484 FLU IMMUNIZE NO ADMIN: HCPCS | Performed by: FAMILY MEDICINE

## 2023-10-26 PROCEDURE — G8427 DOCREV CUR MEDS BY ELIG CLIN: HCPCS | Performed by: FAMILY MEDICINE

## 2023-10-26 PROCEDURE — G8417 CALC BMI ABV UP PARAM F/U: HCPCS | Performed by: FAMILY MEDICINE

## 2023-10-26 RX ORDER — IBUPROFEN 200 MG
200 TABLET ORAL EVERY 6 HOURS PRN
COMMUNITY

## 2023-10-26 ASSESSMENT — ENCOUNTER SYMPTOMS
ABDOMINAL PAIN: 0
COUGH: 0
NAUSEA: 0
CONSTIPATION: 0
SORE THROAT: 0
SHORTNESS OF BREATH: 0
RHINORRHEA: 0
DIARRHEA: 0
WHEEZING: 0

## 2023-10-26 NOTE — PROGRESS NOTES
[address] [facility] MEDICINE  [address] Gerri [place] [address]  Dept: [phone]  Loc: [address] [place] [address] Rd (:  1950) is a 68 y.o. female, here for evaluation of the following chief complaint(s):  Vaginitis (Pt states she has been issues with yeast infections since July. She states she has been seeing gynecologist. Avila Fus she did get on Diflucan. She states she notice white tongue last night, and concerned about thrush. )      ASSESSMENT/PLAN:  1. Oral thrush    Take diflucan as prescribed per gyn. If symptoms worsen or persist, consider oral  nystatin swish  No follow-ups on file. SUBJECTIVE/OBJECTIVE:  HPI  Presents to discuss possible oral thrush. She states that she has been dealing with vaginal thrush and yeast in her skin folds and was seen by her gynecologist yesterday. She was placed on Diflucan. She states yesterday evening, she looked in the mirror and noticed white patch on her tongue. She did take first dose of Diflucan 200 mg last night and thinks this morning it woke up a little better. Denies any pain or discomfort or difficulty swallowing. Review of Systems   Constitutional:  Negative for chills, fatigue and fever. HENT:  Positive for mouth sores. Negative for congestion, rhinorrhea and sore throat. Respiratory:  Negative for cough, shortness of breath and wheezing. Cardiovascular:  Negative for chest pain and palpitations. Gastrointestinal:  Negative for abdominal pain, constipation, diarrhea and nausea. Genitourinary:  Positive for vaginal pain (discomfort from yeast infection). Negative for dysuria and hematuria. Musculoskeletal:  Negative for arthralgias and myalgias. Neurological:  Negative for dizziness and headaches. Psychiatric/Behavioral:  Negative for sleep disturbance. The patient is not nervous/anxious. Physical Exam  Vitals and nursing note reviewed.

## 2023-11-03 ENCOUNTER — PATIENT MESSAGE (OUTPATIENT)
Dept: FAMILY MEDICINE CLINIC | Age: 73
End: 2023-11-03

## 2023-11-03 NOTE — TELEPHONE ENCOUNTER
From: Lito Huang  To: Dr. Alissa Wong: 11/3/2023 2:18 PM EDT  Subject: motrin    I am off Eliquis now. At wellness visit I forgot to ask for a prescription for Ibuprofen.  Previous outdated script was for     Ibuprofen 600 mg   1 tab every six hours  #360 ( 90 day supply)    This needs to go to [facility] mail order    thanks  Danna

## 2023-11-05 RX ORDER — IBUPROFEN 600 MG/1
600 TABLET ORAL EVERY 6 HOURS PRN
Qty: 120 TABLET | Refills: 1 | Status: SHIPPED | OUTPATIENT
Start: 2023-11-05

## 2024-04-29 ENCOUNTER — OFFICE VISIT (OUTPATIENT)
Dept: CARDIOLOGY CLINIC | Age: 74
End: 2024-04-29
Payer: MEDICARE

## 2024-04-29 VITALS
HEART RATE: 77 BPM | WEIGHT: 215.4 LBS | SYSTOLIC BLOOD PRESSURE: 146 MMHG | DIASTOLIC BLOOD PRESSURE: 76 MMHG | BODY MASS INDEX: 34.62 KG/M2 | HEIGHT: 66 IN

## 2024-04-29 DIAGNOSIS — E78.01 FAMILIAL HYPERCHOLESTEROLEMIA: ICD-10-CM

## 2024-04-29 DIAGNOSIS — I48.0 PAROXYSMAL ATRIAL FIBRILLATION (HCC): Primary | ICD-10-CM

## 2024-04-29 DIAGNOSIS — I10 PRIMARY HYPERTENSION: ICD-10-CM

## 2024-04-29 PROCEDURE — 1090F PRES/ABSN URINE INCON ASSESS: CPT | Performed by: NUCLEAR MEDICINE

## 2024-04-29 PROCEDURE — 3078F DIAST BP <80 MM HG: CPT | Performed by: NUCLEAR MEDICINE

## 2024-04-29 PROCEDURE — 3017F COLORECTAL CA SCREEN DOC REV: CPT | Performed by: NUCLEAR MEDICINE

## 2024-04-29 PROCEDURE — G8417 CALC BMI ABV UP PARAM F/U: HCPCS | Performed by: NUCLEAR MEDICINE

## 2024-04-29 PROCEDURE — G8427 DOCREV CUR MEDS BY ELIG CLIN: HCPCS | Performed by: NUCLEAR MEDICINE

## 2024-04-29 PROCEDURE — 93000 ELECTROCARDIOGRAM COMPLETE: CPT | Performed by: NUCLEAR MEDICINE

## 2024-04-29 PROCEDURE — 1036F TOBACCO NON-USER: CPT | Performed by: NUCLEAR MEDICINE

## 2024-04-29 PROCEDURE — 99213 OFFICE O/P EST LOW 20 MIN: CPT | Performed by: NUCLEAR MEDICINE

## 2024-04-29 PROCEDURE — G8399 PT W/DXA RESULTS DOCUMENT: HCPCS | Performed by: NUCLEAR MEDICINE

## 2024-04-29 PROCEDURE — 1123F ACP DISCUSS/DSCN MKR DOCD: CPT | Performed by: NUCLEAR MEDICINE

## 2024-04-29 PROCEDURE — 3077F SYST BP >= 140 MM HG: CPT | Performed by: NUCLEAR MEDICINE

## 2024-04-29 RX ORDER — CHOLECALCIFEROL (VITAMIN D3) 125 MCG
5 CAPSULE ORAL NIGHTLY PRN
COMMUNITY

## 2024-04-29 RX ORDER — HYDROCHLOROTHIAZIDE 25 MG/1
25 TABLET ORAL DAILY
COMMUNITY
End: 2024-04-29 | Stop reason: SDUPTHER

## 2024-04-29 RX ORDER — ASPIRIN 81 MG/1
81 TABLET ORAL DAILY
COMMUNITY

## 2024-04-29 RX ORDER — HYDROCHLOROTHIAZIDE 25 MG/1
25 TABLET ORAL DAILY
Qty: 90 TABLET | Refills: 3 | Status: SHIPPED | OUTPATIENT
Start: 2024-04-29

## 2024-04-29 RX ORDER — METOPROLOL SUCCINATE 25 MG/1
25 TABLET, EXTENDED RELEASE ORAL 2 TIMES DAILY
Qty: 180 TABLET | Refills: 3 | Status: SHIPPED | OUTPATIENT
Start: 2024-04-29

## 2024-04-29 NOTE — PROGRESS NOTES
Patient here for check up.  EKG done today.  Patient complains of pitting swelling in legs. Last for a week.   Patient has apple watch log to show.

## 2024-04-29 NOTE — PROGRESS NOTES
[facility] PHYSICIANS [place] [facility]  [facility] CARDIOLOGY  [address]  [address]  [facility] [address]  Dept: [phone]  Dept Fax: [phone]  Loc: [phone]    Visit Date: 2024    Malia Garrido is a 73 y.o. female who presents todayfor:  Chief Complaint   Patient presents with    Check-Up    Atrial Fibrillation    Hypertension    Hyperlipidemia     A fib is stable  Some leg edema at times  No angina  BP is borderline   Some weight gain   And leg edema  Thyroid is stable  Last TSH was okay       HPI:  HPI  Past Medical History:   Diagnosis Date    Anticoagulant long-term use     no longer take    Atrial fibrillation (HCC)     questionable    Chronic back pain     Hypothyroidism     Insulin resistance     Obesity     Osteoarthritis     Pancreatitis     r/t Gall bladder    Type 2 diabetes mellitus without complication (HCC)       Past Surgical History:   Procedure Laterality Date    APPENDECTOMY      BREAST BIOPSY Left     benign     SECTION      CHOLECYSTECTOMY      EYE SURGERY      retinal repair    HYSTERECTOMY (CERVIX STATUS UNKNOWN)  1985    HYSTERECTOMY, TOTAL ABDOMINAL (CERVIX REMOVED)      OVARY REMOVAL Bilateral     [place] BX OF BREAST, NEEDLE CORE, IMAGE GUIDE Left     benign    TONSILLECTOMY      UPPER GASTROINTESTINAL ENDOSCOPY       Family History   Problem Relation Age of Onset    Diabetes Mother     Heart Disease Mother     Cancer Mother     Breast Cancer Mother 70    Heart Disease Father     Cancer Father         bone    High Blood Pressure Father     High Cholesterol Father     Prostate Cancer Father     Heart Disease Brother         aneurysm    Cancer Brother     Prostate Cancer Brother     Heart Disease Paternal Uncle     Other Maternal Grandmother         aneurysm    Heart Disease Paternal Grandmother     Heart Disease Paternal Grandfather     Colon Cancer Paternal Grandfather      Social History     Tobacco Use    Smoking status: Never    Smokeless

## 2024-05-07 DIAGNOSIS — E03.9 ACQUIRED HYPOTHYROIDISM: ICD-10-CM

## 2024-05-07 RX ORDER — THYROID 60 MG/1
TABLET ORAL
Qty: 78 TABLET | Refills: 3 | Status: SHIPPED | OUTPATIENT
Start: 2024-05-07

## 2024-05-07 NOTE — TELEPHONE ENCOUNTER
Received a refill request on patients NP Thyroid 60 mg.     Last appointment this department: 10/26/2023  Next appointment this department: 9/25/2024    Ph[facility]acy [facility]

## 2024-05-13 ENCOUNTER — HOSPITAL ENCOUNTER (OUTPATIENT)
Age: 74
Discharge: HOME | End: 2024-05-15
Attending: NUCLEAR MEDICINE
Payer: MEDICARE

## 2024-05-13 VITALS
HEIGHT: 66 IN | BODY MASS INDEX: 34.55 KG/M2 | DIASTOLIC BLOOD PRESSURE: 76 MMHG | SYSTOLIC BLOOD PRESSURE: 146 MMHG | WEIGHT: 215 LBS

## 2024-05-13 DIAGNOSIS — I48.0 PAROXYSMAL ATRIAL FIBRILLATION (HCC): ICD-10-CM

## 2024-05-13 DIAGNOSIS — I10 PRIMARY HYPERTENSION: ICD-10-CM

## 2024-05-13 DIAGNOSIS — E78.01 FAMILIAL HYPERCHOLESTEROLEMIA: ICD-10-CM

## 2024-05-13 LAB
ECHO AO ASC DIAM: 3.4 CM
ECHO AO ASCENDING AORTA INDEX: 1.65 CM/M2
ECHO AV CUSP MM: 1.7 CM
ECHO AV PEAK GRADIENT: 11 MMHG
ECHO AV PEAK VELOCITY: 1.6 M/S
ECHO AV VELOCITY RATIO: 0.94
ECHO BSA: 2.13 M2
ECHO LA AREA 2C: 13.7 CM2
ECHO LA AREA 4C: 14.3 CM2
ECHO LA DIAMETER INDEX: 1.75 CM/M2
ECHO LA DIAMETER: 3.6 CM
ECHO LA MAJOR AXIS: 4.7 CM
ECHO LA MINOR AXIS: 4.8 CM
ECHO LA VOL BP: 34 ML (ref 22–52)
ECHO LA VOL MOD A2C: 32 ML (ref 22–52)
ECHO LA VOL MOD A4C: 34 ML (ref 22–52)
ECHO LA VOL/BSA BIPLANE: 17 ML/M2 (ref 16–34)
ECHO LA VOLUME INDEX MOD A2C: 16 ML/M2 (ref 16–34)
ECHO LA VOLUME INDEX MOD A4C: 17 ML/M2 (ref 16–34)
ECHO LV E' LATERAL VELOCITY: 7 CM/S
ECHO LV E' SEPTAL VELOCITY: 6 CM/S
ECHO LV FRACTIONAL SHORTENING: 30 % (ref 28–44)
ECHO LV INTERNAL DIMENSION DIASTOLE INDEX: 1.6 CM/M2
ECHO LV INTERNAL DIMENSION DIASTOLIC: 3.3 CM (ref 3.9–5.3)
ECHO LV INTERNAL DIMENSION SYSTOLIC INDEX: 1.12 CM/M2
ECHO LV INTERNAL DIMENSION SYSTOLIC: 2.3 CM
ECHO LV ISOVOLUMETRIC RELAXATION TIME (IVRT): 92 MS
ECHO LV IVSD: 1.1 CM (ref 0.6–0.9)
ECHO LV MASS 2D: 101.7 G (ref 67–162)
ECHO LV MASS INDEX 2D: 49.4 G/M2 (ref 43–95)
ECHO LV POSTERIOR WALL DIASTOLIC: 1 CM (ref 0.6–0.9)
ECHO LV RELATIVE WALL THICKNESS RATIO: 0.61
ECHO LVOT PEAK GRADIENT: 9 MMHG
ECHO LVOT PEAK VELOCITY: 1.5 M/S
ECHO MV A VELOCITY: 1.08 M/S
ECHO MV E DECELERATION TIME (DT): 283 MS
ECHO MV E VELOCITY: 0.76 M/S
ECHO MV E/A RATIO: 0.7
ECHO MV E/E' LATERAL: 10.86
ECHO MV E/E' RATIO (AVERAGED): 11.76
ECHO MV E/E' SEPTAL: 12.67
ECHO MV REGURGITANT PEAK GRADIENT: 100 MMHG
ECHO MV REGURGITANT PEAK VELOCITY: 5 M/S
ECHO PULMONARY ARTERY END DIASTOLIC PRESSURE: 7 MMHG
ECHO PV MAX VELOCITY: 0.9 M/S
ECHO PV PEAK GRADIENT: 3 MMHG
ECHO PV REGURGITANT MAX VELOCITY: 1.3 M/S
ECHO RV INTERNAL DIMENSION: 2.3 CM
ECHO RV TAPSE: 1.9 CM (ref 1.7–?)
ECHO TV E WAVE: 0.7 M/S
ECHO TV REGURGITANT MAX VELOCITY: 2.89 M/S
ECHO TV REGURGITANT PEAK GRADIENT: 33 MMHG

## 2024-05-13 PROCEDURE — 93306 TTE W/DOPPLER COMPLETE: CPT

## 2024-05-13 PROCEDURE — 93306 TTE W/DOPPLER COMPLETE: CPT | Performed by: NUCLEAR MEDICINE

## 2024-07-01 ENCOUNTER — PATIENT MESSAGE (OUTPATIENT)
Dept: FAMILY MEDICINE CLINIC | Age: 74
End: 2024-07-01

## 2024-07-01 ENCOUNTER — TELEPHONE (OUTPATIENT)
Dept: CARDIOLOGY CLINIC | Age: 74
End: 2024-07-01

## 2024-07-01 DIAGNOSIS — R00.2 PALPITATIONS: Primary | ICD-10-CM

## 2024-07-01 DIAGNOSIS — E03.9 ACQUIRED HYPOTHYROIDISM: ICD-10-CM

## 2024-07-01 RX ORDER — THYROID 60 MG/1
TABLET ORAL
Qty: 78 TABLET | Refills: 3 | Status: SHIPPED | OUTPATIENT
Start: 2024-07-01

## 2024-07-01 NOTE — TELEPHONE ENCOUNTER
Pt notified- appt made for her to see Deborah at the end of August once we receive results to discuss.

## 2024-07-01 NOTE — TELEPHONE ENCOUNTER
From: Malia Garrido  To: Dr. Rosemary Chiu  Sent: 7/1/2024 4:46 AM EDT  Subject: Prescription    Sorry to bother you. Last requested prescription refill for NP thyroid 60 mg was sent to local pharmacy in error. It needs to be sent to [facility] for 90 day supply.   Should be ordered  NP Thyroid tab 60 mg take 1 tab daily Monday thru Saturday quantity 78    Thanks for your help

## 2024-07-01 NOTE — TELEPHONE ENCOUNTER
I called and talked to patient and she is agreeable to monitor.   However, she is leaving to go out of town on Wednesday and will be out of town for 3 weeks.   Order for monitor given to scheduling.   Do you want her to start OAC prior to getting monitor on?

## 2024-07-01 NOTE — TELEPHONE ENCOUNTER
----- Message from Malia Garrido sent at 7/1/2024  4:24 AM EDT -----  Regarding: Heart rhythm   Contact: [phone]    Last nite after getting up to [place] had this. This is just on Apple watch. What does Dr Ayon think?  Will follow with another that looks worse     but did not read afib    I am currently on a carb restrictive diet trying desperately to lose weight because My weight is just multiplying faster than it ever has. I talked to Dr Ayon about the connection between the beta blocker and weight gain. He did not think there was a connection.   Could there a connection between heart rhythm and being in ketosis. My heart rhythm issue has not been a problem for years. Trying to find connection to what is different now.   Thanks for your help. I rely on Motrin for bad days with my arthritis and back issues. Just trying to stay away from needing blood thinner.

## 2024-07-01 NOTE — TELEPHONE ENCOUNTER
This is mostly frequent extra beats with a short A fib run  Unfortunately there are many factors that could be causing this  She might need to start thinking about being on a blood thinner in order to avoid stroke risk   We can order official 2 week monitor as well and see one of our mid levels after that

## 2024-07-02 NOTE — TELEPHONE ENCOUNTER
Please see My Chart Message:     Malia Garrido \"Shannan\"  P [facility] Clinical Staff (supporting Gela Nichols MD)22 minutes ago (12:29 PM)       Had another heart racing spell. Apple watch was inconclusive but looked much like i had sent yesterday. Wondering if Dr Ayon would mind me starting on the Eliquis if I have another spell of A fib. Will send inconclusive reading.       Malia Garrido \"Shannan\"  P [facility] Clinical Staff (supporting Gela Nichols MD)21 minutes ago (12:31 PM)     This is inconclusive from watch. Heart rate was up to 150’s after this. Better now. Thanks   Attachments   ECG 2024-07-02 at 10.56.pdf

## 2024-07-02 NOTE — TELEPHONE ENCOUNTER
Spoke to patient, notified.   She would like for script to go to [facility].  Rx pended.     She does have Eliquis from previous at home, she will start tonight.    rehabilitation services

## 2024-07-23 ENCOUNTER — HOSPITAL ENCOUNTER (OUTPATIENT)
Age: 74
Discharge: HOME | End: 2024-07-25
Attending: NUCLEAR MEDICINE
Payer: MEDICARE

## 2024-07-23 DIAGNOSIS — R00.2 PALPITATIONS: ICD-10-CM

## 2024-07-23 PROCEDURE — 93270 REMOTE 30 DAY ECG REV/REPORT: CPT

## 2024-08-19 ENCOUNTER — HOSPITAL ENCOUNTER (OUTPATIENT)
Dept: WOMENS IMAGING | Age: 74
Discharge: HOME | End: 2024-08-19
Payer: MEDICARE

## 2024-08-19 DIAGNOSIS — Z12.31 ENCOUNTER FOR SCREENING MAMMOGRAM FOR MALIGNANT NEOPLASM OF BREAST: ICD-10-CM

## 2024-08-19 PROCEDURE — 77063 BREAST TOMOSYNTHESIS BI: CPT

## 2024-08-27 ENCOUNTER — HOSPITAL ENCOUNTER (OUTPATIENT)
Age: 74
Discharge: HOME | End: 2024-08-27
Payer: MEDICARE

## 2024-08-27 ENCOUNTER — OFFICE VISIT (OUTPATIENT)
Dept: CARDIOLOGY CLINIC | Age: 74
End: 2024-08-27

## 2024-08-27 VITALS
DIASTOLIC BLOOD PRESSURE: 90 MMHG | SYSTOLIC BLOOD PRESSURE: 152 MMHG | BODY MASS INDEX: 34.68 KG/M2 | WEIGHT: 215.8 LBS | HEART RATE: 80 BPM | HEIGHT: 66 IN

## 2024-08-27 DIAGNOSIS — I48.0 PAROXYSMAL ATRIAL FIBRILLATION (HCC): ICD-10-CM

## 2024-08-27 DIAGNOSIS — I49.3 PVC (PREMATURE VENTRICULAR CONTRACTION): ICD-10-CM

## 2024-08-27 DIAGNOSIS — R00.2 PALPITATIONS: Primary | ICD-10-CM

## 2024-08-27 DIAGNOSIS — I49.1 PAC (PREMATURE ATRIAL CONTRACTION): ICD-10-CM

## 2024-08-27 DIAGNOSIS — I10 PRIMARY HYPERTENSION: ICD-10-CM

## 2024-08-27 DIAGNOSIS — R00.2 PALPITATIONS: ICD-10-CM

## 2024-08-27 LAB
ANION GAP SERPL CALC-SCNC: 10 MEQ/L (ref 8–16)
BUN SERPL-MCNC: 14 MG/DL (ref 7–22)
CALCIUM SERPL-MCNC: 9.3 MG/DL (ref 8.5–10.5)
CHLORIDE SERPL-SCNC: 103 MEQ/L (ref 98–111)
CO2 SERPL-SCNC: 28 MEQ/L (ref 23–33)
CREAT SERPL-MCNC: 0.7 MG/DL (ref 0.4–1.2)
GFR SERPL CREATININE-BSD FRML MDRD: > 90 ML/MIN/1.73M2
GLUCOSE SERPL-MCNC: 101 MG/DL (ref 70–108)
MAGNESIUM SERPL-MCNC: 2 MG/DL (ref 1.6–2.4)
POTASSIUM SERPL-SCNC: 4.2 MEQ/L (ref 3.5–5.2)
SODIUM SERPL-SCNC: 141 MEQ/L (ref 135–145)
TSH SERPL DL<=0.005 MIU/L-ACNC: 1.68 UIU/ML (ref 0.4–4.2)

## 2024-08-27 PROCEDURE — 36415 COLL VENOUS BLD VENIPUNCTURE: CPT

## 2024-08-27 PROCEDURE — 84443 ASSAY THYROID STIM HORMONE: CPT

## 2024-08-27 PROCEDURE — 80048 BASIC METABOLIC PNL TOTAL CA: CPT

## 2024-08-27 PROCEDURE — 83735 ASSAY OF MAGNESIUM: CPT

## 2024-08-27 RX ORDER — METOPROLOL SUCCINATE 25 MG/1
25 TABLET, EXTENDED RELEASE ORAL DAILY PRN
Qty: 180 TABLET | Refills: 3
Start: 2024-08-27

## 2024-08-27 RX ORDER — DILTIAZEM HYDROCHLORIDE 120 MG/1
120 CAPSULE, COATED, EXTENDED RELEASE ORAL DAILY
Qty: 90 CAPSULE | Refills: 3 | Status: SHIPPED | OUTPATIENT
Start: 2024-08-27

## 2024-08-27 RX ORDER — DILTIAZEM HCL 60 MG
60 TABLET ORAL 2 TIMES DAILY PRN
Qty: 30 TABLET | Refills: 1 | Status: SHIPPED | OUTPATIENT
Start: 2024-08-27

## 2024-08-27 NOTE — PROGRESS NOTES
(ICD-10-CM)]; Familial hypercholesterolemia [E78.01 (ICD-10-CM)]     PACS Images     Show images for Echo (TTE) complete (PRN contrast/bubble/strain/3D)  Interpretation Summary  Show Result Comparison     Left Ventricle: Normal left ventricular systolic function with a visually estimated EF of 55 - 60%. Left ventricle size is normal. Normal wall thickness. Normal wall motion. Normal diastolic function.    Image quality is technically difficult. Technically difficult study due to patient's body habitus.     Comparison Study Information    Prior Study    There is a prior study available for comparison. Prior study date: 5/10/2021.    Summary   Technically difficult examination.   Ejection fraction is visually estimated at 60%.   Overall left ventricular function is normal.   The aortic valve leaflets were not well visualized.   Aortic valve appears tricuspid.   Aortic valve leaflets are somewhat thickened.   Aortic valve leaflets are Mildly calcified.    Signature    ----------------------------------------------------------------   Electronically signed by Gela Nichols MD (Interpreting   physician) on 05/10/2021 at 04:05 PM     Echo Findings  Left Ventricle Normal left ventricular systolic function with a visually estimated EF of 55 - 60%. Left ventricle size is normal. Normal wall thickness. Normal wall motion. Normal diastolic function.   Left Atrium Left atrium size is normal.   Right Ventricle Right ventricle size is normal. Normal systolic function.   Right Atrium Right atrium size is normal.   Aortic Valve Valve structure is normal. No regurgitation. No stenosis.   Mitral Valve Valve structure is normal. Trace regurgitation. No stenosis noted.   Tricuspid Valve Valve structure is normal. Trace regurgitation. No stenosis noted.   Pulmonic Valve The pulmonic valve was not well visualized. Valve structure is normal. Trace regurgitation. No stenosis noted.   Aorta Normal sized aortic root and ascending aorta.

## 2024-08-27 NOTE — PATIENT INSTRUCTIONS
Stop the Metoprolol - keep in case need for breakthrough rapid heart rate    Start the Cardizem  mg daily once you receive it - stop the Toprol XL and then start the Cardizem the next day.     Call or send me an update as to how you are feeling.     Have the labwork done as prescribed - checking electrolytes, Magnesium and thyroid.     Continue the Eliquis.     Stay well hydrated.     Do not start the diuretic until we know what the labs show.     Continue other current medications as prescribed.    Stay as active as you can.     Eat heart healthy diet.     Follow-up with your PCP as scheduled.    Follow-up with Dr. Escamilla in May as scheduled or sooner if need.     Follow up with Deborah MURPHY in 3 months or sooner if need.

## 2024-09-25 ENCOUNTER — OFFICE VISIT (OUTPATIENT)
Dept: FAMILY MEDICINE CLINIC | Age: 74
End: 2024-09-25
Payer: MEDICARE

## 2024-09-25 VITALS
RESPIRATION RATE: 16 BRPM | DIASTOLIC BLOOD PRESSURE: 72 MMHG | OXYGEN SATURATION: 96 % | WEIGHT: 212 LBS | BODY MASS INDEX: 34.07 KG/M2 | HEART RATE: 81 BPM | SYSTOLIC BLOOD PRESSURE: 140 MMHG | HEIGHT: 66 IN

## 2024-09-25 DIAGNOSIS — N76.1 CHRONIC VAGINITIS: ICD-10-CM

## 2024-09-25 DIAGNOSIS — E03.9 ACQUIRED HYPOTHYROIDISM: ICD-10-CM

## 2024-09-25 DIAGNOSIS — E11.65 TYPE 2 DIABETES MELLITUS WITH HYPERGLYCEMIA, WITHOUT LONG-TERM CURRENT USE OF INSULIN (HCC): ICD-10-CM

## 2024-09-25 DIAGNOSIS — B37.2 YEAST DERMATITIS: ICD-10-CM

## 2024-09-25 DIAGNOSIS — Z00.00 MEDICARE ANNUAL WELLNESS VISIT, SUBSEQUENT: Primary | ICD-10-CM

## 2024-09-25 DIAGNOSIS — R20.0 NUMBNESS AND TINGLING OF BOTH FEET: ICD-10-CM

## 2024-09-25 DIAGNOSIS — R20.2 NUMBNESS AND TINGLING OF BOTH FEET: ICD-10-CM

## 2024-09-25 PROCEDURE — 3046F HEMOGLOBIN A1C LEVEL >9.0%: CPT | Performed by: FAMILY MEDICINE

## 2024-09-25 PROCEDURE — 3017F COLORECTAL CA SCREEN DOC REV: CPT | Performed by: FAMILY MEDICINE

## 2024-09-25 PROCEDURE — G0439 PPPS, SUBSEQ VISIT: HCPCS | Performed by: FAMILY MEDICINE

## 2024-09-25 PROCEDURE — 1123F ACP DISCUSS/DSCN MKR DOCD: CPT | Performed by: FAMILY MEDICINE

## 2024-09-25 RX ORDER — THYROID 90 MG/1
90 TABLET ORAL WEEKLY
Qty: 13 TABLET | Refills: 3 | Status: SHIPPED | OUTPATIENT
Start: 2024-09-25 | End: 2024-09-25 | Stop reason: SDUPTHER

## 2024-09-25 RX ORDER — THYROID 90 MG/1
90 TABLET ORAL WEEKLY
Qty: 13 TABLET | Refills: 3 | Status: SHIPPED | OUTPATIENT
Start: 2024-09-25

## 2024-09-25 RX ORDER — ESTRADIOL 0.1 MG/G
1 CREAM VAGINAL SEE ADMIN INSTRUCTIONS
Qty: 42.5 G | Refills: 1 | Status: SHIPPED | OUTPATIENT
Start: 2024-09-25

## 2024-09-25 RX ORDER — ESTRADIOL 0.1 MG/G
1 CREAM VAGINAL SEE ADMIN INSTRUCTIONS
Qty: 42.5 G | Refills: 1 | Status: SHIPPED | OUTPATIENT
Start: 2024-09-25 | End: 2024-09-25 | Stop reason: SDUPTHER

## 2024-09-25 RX ORDER — THYROID 60 MG/1
TABLET ORAL
Qty: 78 TABLET | Refills: 3 | Status: SHIPPED | OUTPATIENT
Start: 2024-09-25

## 2024-09-25 RX ORDER — ATORVASTATIN CALCIUM 20 MG/1
20 TABLET, FILM COATED ORAL DAILY
Qty: 90 TABLET | Refills: 3 | Status: SHIPPED | OUTPATIENT
Start: 2024-09-25

## 2024-09-25 RX ORDER — NYSTATIN 100000 U/G
CREAM TOPICAL
Qty: 30 G | Refills: 3 | Status: SHIPPED | OUTPATIENT
Start: 2024-09-25

## 2024-09-25 RX ORDER — ATORVASTATIN CALCIUM 20 MG/1
20 TABLET, FILM COATED ORAL DAILY
Qty: 90 TABLET | Refills: 3 | Status: SHIPPED | OUTPATIENT
Start: 2024-09-25 | End: 2024-09-25 | Stop reason: SDUPTHER

## 2024-09-25 RX ORDER — GABAPENTIN 100 MG/1
100 CAPSULE ORAL NIGHTLY
Qty: 90 CAPSULE | Refills: 0 | OUTPATIENT
Start: 2024-09-25 | End: 2024-12-24

## 2024-09-25 RX ORDER — THYROID 60 MG/1
TABLET ORAL
Qty: 78 TABLET | Refills: 3 | Status: SHIPPED | OUTPATIENT
Start: 2024-09-25 | End: 2024-09-25 | Stop reason: SDUPTHER

## 2024-09-25 SDOH — ECONOMIC STABILITY: FOOD INSECURITY: WITHIN THE PAST 12 MONTHS, YOU WORRIED THAT YOUR FOOD WOULD RUN OUT BEFORE YOU GOT MONEY TO BUY MORE.: NEVER TRUE

## 2024-09-25 SDOH — ECONOMIC STABILITY: FOOD INSECURITY: WITHIN THE PAST 12 MONTHS, THE FOOD YOU BOUGHT JUST DIDN'T LAST AND YOU DIDN'T HAVE MONEY TO GET MORE.: NEVER TRUE

## 2024-09-25 SDOH — ECONOMIC STABILITY: INCOME INSECURITY: HOW HARD IS IT FOR YOU TO PAY FOR THE VERY BASICS LIKE FOOD, HOUSING, MEDICAL CARE, AND HEATING?: NOT HARD AT ALL

## 2024-09-25 ASSESSMENT — PATIENT HEALTH QUESTIONNAIRE - PHQ9
SUM OF ALL RESPONSES TO PHQ QUESTIONS 1-9: 0
SUM OF ALL RESPONSES TO PHQ9 QUESTIONS 1 & 2: 0
1. LITTLE INTEREST OR PLEASURE IN DOING THINGS: NOT AT ALL
2. FEELING DOWN, DEPRESSED OR HOPELESS: NOT AT ALL
SUM OF ALL RESPONSES TO PHQ QUESTIONS 1-9: 0

## 2024-09-26 ENCOUNTER — LAB (OUTPATIENT)
Dept: LAB | Age: 74
End: 2024-09-26

## 2024-09-26 DIAGNOSIS — E11.65 TYPE 2 DIABETES MELLITUS WITH HYPERGLYCEMIA, WITHOUT LONG-TERM CURRENT USE OF INSULIN (HCC): ICD-10-CM

## 2024-09-26 LAB
BASOPHILS ABSOLUTE: 0.1 THOU/MM3 (ref 0–0.1)
BASOPHILS NFR BLD AUTO: 1.8 %
CHOLEST SERPL-MCNC: 141 MG/DL (ref 100–199)
DEPRECATED MEAN GLUCOSE BLD GHB EST-ACNC: 147 MG/DL (ref 70–126)
DEPRECATED RDW RBC AUTO: 42.5 FL (ref 35–45)
EOSINOPHIL NFR BLD AUTO: 3.1 %
EOSINOPHILS ABSOLUTE: 0.2 THOU/MM3 (ref 0–0.4)
ERYTHROCYTE [DISTWIDTH] IN BLOOD BY AUTOMATED COUNT: 12.8 % (ref 11.5–14.5)
HBA1C MFR BLD HPLC: 6.9 % (ref 4.4–6.4)
HCT VFR BLD AUTO: 41.4 % (ref 37–47)
HDLC SERPL-MCNC: 44 MG/DL
HGB BLD-MCNC: 13.9 GM/DL (ref 12–16)
IMM GRANULOCYTES # BLD AUTO: 0.02 THOU/MM3 (ref 0–0.07)
IMM GRANULOCYTES NFR BLD AUTO: 0.4 %
LDLC SERPL CALC-MCNC: 74 MG/DL
LYMPHOCYTES ABSOLUTE: 0.9 THOU/MM3 (ref 1–4.8)
LYMPHOCYTES NFR BLD AUTO: 17.2 %
MCH RBC QN AUTO: 30.7 PG (ref 26–33)
MCHC RBC AUTO-ENTMCNC: 33.6 GM/DL (ref 32.2–35.5)
MCV RBC AUTO: 91.4 FL (ref 81–99)
MONOCYTES ABSOLUTE: 0.5 THOU/MM3 (ref 0.4–1.3)
MONOCYTES NFR BLD AUTO: 10.7 %
NEUTROPHILS ABSOLUTE: 3.4 THOU/MM3 (ref 1.8–7.7)
NEUTROPHILS NFR BLD AUTO: 66.8 %
NRBC BLD AUTO-RTO: 0 /100 WBC
PLATELET # BLD AUTO: 249 THOU/MM3 (ref 130–400)
PMV BLD AUTO: 10.8 FL (ref 9.4–12.4)
RBC # BLD AUTO: 4.53 MILL/MM3 (ref 4.2–5.4)
TRIGL SERPL-MCNC: 117 MG/DL (ref 0–199)
WBC # BLD AUTO: 5.1 THOU/MM3 (ref 4.8–10.8)

## 2024-10-29 ENCOUNTER — TELEPHONE (OUTPATIENT)
Dept: CARDIOLOGY CLINIC | Age: 74
End: 2024-10-29

## 2024-10-29 NOTE — TELEPHONE ENCOUNTER
Malia Garrido \"Shannan\"  P [facility] Clinical Staff (supporting Deborah Martin, APRN - CNP)1 hour ago (5:27 AM)       Ac Cabrera, Since back on Eliquis I have not found help for inflammation and arthritis pain improvement. I just hurt all over. Have been reading about MCT supplements and wondering if there are contraindications with meds.  Considering [facility] by Dr Jania DUDLEY. Will attach pics.   It does contain Caprylic acid. Also supposed to help with controlling yeast growth. Have been feeling fairly symptom free since being put on Cardiazem. Most of palpitations etc have stopped. Wondering if they were side effects of metoprolol. Weight is stable. Now to get excess off.   Meds I am on are NP thyroid, Eliquis, Cardiazem, Hydrochlorothiazide, lipitor. So wondering if there are  contraindications to these meds when taking the supplement. Planning on taking supplement midday to separate from meds. Thoughts. Will see you again in November. Thanks

## 2024-11-01 NOTE — TELEPHONE ENCOUNTER
Called and left patient message regarding use of product -adds additional bleeding risk similar to Ibuprofen - could try 1/4 or 1/2 scoop and monitor for any signs of increased bruising or abnormal bleeding.   Deborah Martin, APRN - CNP

## 2024-11-19 DIAGNOSIS — N76.1 CHRONIC VAGINITIS: ICD-10-CM

## 2024-11-19 RX ORDER — ESTRADIOL 0.1 MG/G
1 CREAM VAGINAL SEE ADMIN INSTRUCTIONS
Qty: 42.5 G | Refills: 1 | Status: SHIPPED | OUTPATIENT
Start: 2024-11-19

## 2024-11-19 NOTE — TELEPHONE ENCOUNTER
Report from [facility] needing clarification on Estrace Vag Cre 0.1MG to include frequency , please resend this with requested information

## 2024-11-20 ENCOUNTER — OFFICE VISIT (OUTPATIENT)
Dept: CARDIOLOGY CLINIC | Age: 74
End: 2024-11-20

## 2024-11-20 VITALS
SYSTOLIC BLOOD PRESSURE: 148 MMHG | DIASTOLIC BLOOD PRESSURE: 84 MMHG | WEIGHT: 209.2 LBS | BODY MASS INDEX: 33.62 KG/M2 | HEIGHT: 66 IN | HEART RATE: 100 BPM

## 2024-11-20 DIAGNOSIS — Z79.01 ANTICOAGULATED: ICD-10-CM

## 2024-11-20 DIAGNOSIS — I10 PRIMARY HYPERTENSION: ICD-10-CM

## 2024-11-20 DIAGNOSIS — E78.01 FAMILIAL HYPERCHOLESTEROLEMIA: ICD-10-CM

## 2024-11-20 DIAGNOSIS — I48.0 PAROXYSMAL ATRIAL FIBRILLATION (HCC): ICD-10-CM

## 2024-11-20 DIAGNOSIS — R00.2 PALPITATIONS: Primary | ICD-10-CM

## 2024-11-20 NOTE — PROGRESS NOTES
Malia Garrido (:  1950) is a 74 y.o. female, Established patient, here for 3 month follow-up evaluation.    3 Month Follow-Up    Last seen in office on 2024 per this writer. Per office note:  Assessment & Plan    Diagnosis Orders   1. Paroxysmal atrial fibrillation (HCC)  Basic Metabolic Panel     Magnesium     TSH With Reflex Ft4     dilTIAZem (CARDIZEM CD) 120 MG extended release capsule     metoprolol succinate (TOPROL XL) 25 MG extended release tablet     dilTIAZem (CARDIZEM) 60 MG tablet       2. Palpitations  Basic Metabolic Panel     Magnesium     TSH With Reflex Ft4       3. PAC (premature atrial contraction)  Basic Metabolic Panel     Magnesium     TSH With Reflex Ft4     dilTIAZem (CARDIZEM CD) 120 MG extended release capsule     metoprolol succinate (TOPROL XL) 25 MG extended release tablet     dilTIAZem (CARDIZEM) 60 MG tablet       4. PVC (premature ventricular contraction)  Basic Metabolic Panel     Magnesium     TSH With Reflex Ft4          As above  Cardiac symptoms   PACs; possible brief episode of atrial fib - back on Eliquis  Mild LE Leg edema  Following keto diet - episode of dehydration with high ketosis reading - episode of afib  Weight gain on BB - wants to change rate control meds     Plan:  PLAN/patient instructions:   Stop the Metoprolol - keep in case need for breakthrough rapid heart rate  Start the Cardizem  mg daily once you receive it - stop the Toprol XL and then start the Cardizem the next day.    Call or send me an update as to how you are feeling.    Have the labwork done as prescribed - checking electrolytes, Magnesium and thyroid.    Continue the Eliquis.    Stay well hydrated.    Do not start the diuretic until we know what the labs show.    Continue other current medications as prescribed.   Stay as active as you can.    Eat heart healthy diet.    Follow-up with your PCP as scheduled.   Follow-up with Dr. Escamilla in May as scheduled or sooner if need.

## 2024-11-20 NOTE — PATIENT INSTRUCTIONS
You may receive a survey regarding the care you received during your visit. We encourage you to complete and return your survey, as your input is valuable to us. We hope you will choose us in the future for your healthcare needs. Thank you!    Your Medical Assistant today: TASHA Daniel  Thank you for coming to our office! It was a pleasure to serve you.         Continue current medications as prescribed.    Stay as active as you can.     Eat heart healthy diet.     Follow-up with your PCP as scheduled.    Follow-up with Dr. Ayon on 5/2/2024 as scheduled or sooner if need.

## 2024-12-06 ENCOUNTER — TELEPHONE (OUTPATIENT)
Dept: CARDIOLOGY CLINIC | Age: 74
End: 2024-12-06

## 2024-12-06 DIAGNOSIS — I48.0 PAROXYSMAL ATRIAL FIBRILLATION (HCC): ICD-10-CM

## 2024-12-06 DIAGNOSIS — I49.1 PAC (PREMATURE ATRIAL CONTRACTION): ICD-10-CM

## 2024-12-06 RX ORDER — DILTIAZEM HYDROCHLORIDE 240 MG/1
240 CAPSULE, COATED, EXTENDED RELEASE ORAL DAILY
Qty: 90 CAPSULE | Refills: 3
Start: 2024-12-06

## 2024-12-06 NOTE — TELEPHONE ENCOUNTER
Please see My Chart Message:    Malia Garrido \"Shannan\"  P [facility] Clinical Staff (supporting Deborah Martin, APRN - CNP)55 minutes ago (8:50 AM)       Since seeing you, had heart flutters off and on. Nothing major. For 3 days prior, more heart flutters. Sporadic high heart rates, pounding heart with PACS mostly when lying down at nite. Also have been having belly gas and belching. Symptoms seem better when I can burp etc. Wondered if hiatal hernia was cause.  No Afib.      Last nite 12/5 this spell lasted about an hour and then flipped into A fib according to watch. Got up to walk off gas, drank cold water, did holding nose technique. Also took Toprol 25 mg  but spell had resolved before I think med had time to work.   Any suggestions ? Taking Eliquis. Action ok as long as spell resolves? Should I have used Cardiazem 60 or Tropol 25?   Heading to [place] next week. Will attach watch EKG   Thanks much!    [facility] - Shannan Garrido - ECG 2024-12-05 at 21.25.pdf

## 2025-01-06 ENCOUNTER — TELEPHONE (OUTPATIENT)
Dept: CARDIOLOGY CLINIC | Age: 75
End: 2025-01-06

## 2025-01-06 NOTE — TELEPHONE ENCOUNTER
Malia Garrido \"Shannan\"  P [facility] Clinical Staff (supporting Deborah Martin, APRN - CNP)11 hours ago (8:14 PM)       8/27/24 switched over to cardiozem 120mg . Felt really good at first.      Then Was having more flutters etc so cardiozem was upped to 240 in December, (i think)     Since down here in [place], have had A fib 3 times.  Dec 19 lasting 2 hours  January 3 lasted 3-4 minute  Jan 5 lasted about 1 hour.  Took Toprolol .   Wondering if Cardiozem is working as it should or if I should try going back on Toprolol.       As a side note and FYI   Since Dec 25, I have had nausea and huge amount of belching, acid reflux, abd burning, constipation . Have had hiatal hernia issues and narrowing of bowel in past. Or Thought maybe had stomach bug or something I ate at [employer]. Days later still had and burning was worse,  kind of like pancreatitis years ago.   Visited ED . Heart work up done. No Afib, troponin ok. Ekg ok, pancreas enzymes ok. Labs ok.  [facility]ERD. [facility]  I see a family Dr down here [place].   It has been 1 week, symptoms are a bit better. Was put on Protonic and Sucralfate. Still alot of belching and some nausea. Not as bad.      Until then could Cardiozem be causing any of the GI stuff and since I have had 3 spells of A fib, is it working for me?   Thanks for your help and thoughts!

## 2025-01-08 NOTE — TELEPHONE ENCOUNTER
O  Might need to be evaluated better than a phone message  But if she wants to go back to metoprolol 25 mg bid instead of cardizem in the mean time that would be fine

## 2025-01-08 NOTE — TELEPHONE ENCOUNTER
Has an appt with her PCP coming up  Will start taking the metoprolol again and stop the cardizem.

## 2025-04-15 SDOH — ECONOMIC STABILITY: FOOD INSECURITY: WITHIN THE PAST 12 MONTHS, THE FOOD YOU BOUGHT JUST DIDN'T LAST AND YOU DIDN'T HAVE MONEY TO GET MORE.: NEVER TRUE

## 2025-04-15 SDOH — ECONOMIC STABILITY: FOOD INSECURITY: WITHIN THE PAST 12 MONTHS, YOU WORRIED THAT YOUR FOOD WOULD RUN OUT BEFORE YOU GOT MONEY TO BUY MORE.: NEVER TRUE

## 2025-04-15 SDOH — ECONOMIC STABILITY: INCOME INSECURITY: IN THE LAST 12 MONTHS, WAS THERE A TIME WHEN YOU WERE NOT ABLE TO PAY THE MORTGAGE OR RENT ON TIME?: NO

## 2025-04-15 ASSESSMENT — PATIENT HEALTH QUESTIONNAIRE - PHQ9
1. LITTLE INTEREST OR PLEASURE IN DOING THINGS: NOT AT ALL
1. LITTLE INTEREST OR PLEASURE IN DOING THINGS: NOT AT ALL
SUM OF ALL RESPONSES TO PHQ QUESTIONS 1-9: 0
SUM OF ALL RESPONSES TO PHQ QUESTIONS 1-9: 0
SUM OF ALL RESPONSES TO PHQ9 QUESTIONS 1 & 2: 0
SUM OF ALL RESPONSES TO PHQ QUESTIONS 1-9: 0
2. FEELING DOWN, DEPRESSED OR HOPELESS: NOT AT ALL
2. FEELING DOWN, DEPRESSED OR HOPELESS: NOT AT ALL
SUM OF ALL RESPONSES TO PHQ QUESTIONS 1-9: 0

## 2025-04-16 ENCOUNTER — OFFICE VISIT (OUTPATIENT)
Dept: FAMILY MEDICINE CLINIC | Age: 75
End: 2025-04-16
Payer: MEDICARE

## 2025-04-16 VITALS
SYSTOLIC BLOOD PRESSURE: 126 MMHG | WEIGHT: 205 LBS | RESPIRATION RATE: 16 BRPM | DIASTOLIC BLOOD PRESSURE: 62 MMHG | BODY MASS INDEX: 32.95 KG/M2 | HEART RATE: 65 BPM | HEIGHT: 66 IN | OXYGEN SATURATION: 98 %

## 2025-04-16 DIAGNOSIS — E11.65 TYPE 2 DIABETES MELLITUS WITH HYPERGLYCEMIA, WITHOUT LONG-TERM CURRENT USE OF INSULIN (HCC): ICD-10-CM

## 2025-04-16 DIAGNOSIS — N76.1 SUBACUTE VAGINITIS: ICD-10-CM

## 2025-04-16 DIAGNOSIS — F41.1 GAD (GENERALIZED ANXIETY DISORDER): Primary | ICD-10-CM

## 2025-04-16 DIAGNOSIS — I10 ESSENTIAL HYPERTENSION: ICD-10-CM

## 2025-04-16 DIAGNOSIS — K21.9 GASTROESOPHAGEAL REFLUX DISEASE, UNSPECIFIED WHETHER ESOPHAGITIS PRESENT: ICD-10-CM

## 2025-04-16 DIAGNOSIS — I48.0 PAROXYSMAL ATRIAL FIBRILLATION (HCC): ICD-10-CM

## 2025-04-16 PROCEDURE — 3078F DIAST BP <80 MM HG: CPT | Performed by: FAMILY MEDICINE

## 2025-04-16 PROCEDURE — 99214 OFFICE O/P EST MOD 30 MIN: CPT | Performed by: FAMILY MEDICINE

## 2025-04-16 PROCEDURE — G8399 PT W/DXA RESULTS DOCUMENT: HCPCS | Performed by: FAMILY MEDICINE

## 2025-04-16 PROCEDURE — 1159F MED LIST DOCD IN RCRD: CPT | Performed by: FAMILY MEDICINE

## 2025-04-16 PROCEDURE — 3017F COLORECTAL CA SCREEN DOC REV: CPT | Performed by: FAMILY MEDICINE

## 2025-04-16 PROCEDURE — 3074F SYST BP LT 130 MM HG: CPT | Performed by: FAMILY MEDICINE

## 2025-04-16 PROCEDURE — 1036F TOBACCO NON-USER: CPT | Performed by: FAMILY MEDICINE

## 2025-04-16 PROCEDURE — G8427 DOCREV CUR MEDS BY ELIG CLIN: HCPCS | Performed by: FAMILY MEDICINE

## 2025-04-16 PROCEDURE — 3046F HEMOGLOBIN A1C LEVEL >9.0%: CPT | Performed by: FAMILY MEDICINE

## 2025-04-16 PROCEDURE — 1123F ACP DISCUSS/DSCN MKR DOCD: CPT | Performed by: FAMILY MEDICINE

## 2025-04-16 PROCEDURE — G8417 CALC BMI ABV UP PARAM F/U: HCPCS | Performed by: FAMILY MEDICINE

## 2025-04-16 PROCEDURE — 2022F DILAT RTA XM EVC RTNOPTHY: CPT | Performed by: FAMILY MEDICINE

## 2025-04-16 PROCEDURE — 1090F PRES/ABSN URINE INCON ASSESS: CPT | Performed by: FAMILY MEDICINE

## 2025-04-16 RX ORDER — ESOMEPRAZOLE MAGNESIUM 40 MG/1
40 CAPSULE, DELAYED RELEASE ORAL DAILY
COMMUNITY
Start: 2025-04-14

## 2025-04-16 RX ORDER — CLINDAMYCIN PHOSPHATE 20 MG/G
CREAM VAGINAL
COMMUNITY
Start: 2025-04-09

## 2025-04-16 RX ORDER — AMMONIUM LACTATE 12 G/100G
LOTION TOPICAL
COMMUNITY
Start: 2025-04-09

## 2025-04-16 RX ORDER — ALPRAZOLAM 0.25 MG
0.25 TABLET ORAL DAILY PRN
Qty: 45 TABLET | Refills: 1 | Status: SHIPPED | OUTPATIENT
Start: 2025-04-16 | End: 2025-07-15

## 2025-04-16 RX ORDER — SUCRALFATE 1 G/1
1 TABLET ORAL 4 TIMES DAILY
COMMUNITY

## 2025-04-16 ASSESSMENT — ENCOUNTER SYMPTOMS
ABDOMINAL PAIN: 0
RHINORRHEA: 0
CONSTIPATION: 0
NAUSEA: 0
SORE THROAT: 0
COUGH: 0
DIARRHEA: 0
WHEEZING: 0
SHORTNESS OF BREATH: 0

## 2025-04-16 NOTE — PROGRESS NOTES
Malia Garrido (:  1950) is a 74 y.o. female,  here for evaluation of the following chief complaint(s):  Medication Check (Discuss GERD new DX/Still having issues with vaginitis recently prescribed clindamycin vag cream  x 4 weeks  /Having more PAC's upcoming visit w/ cardio to discuss /)         Assessment & Plan  1. Vaginitis.  - Reports ongoing symptoms despite being on clindamycin for a week.  - Bicycle riding may exacerbate symptoms due to pressure on the affected area.  - No significant findings noted by the examining physician.  - Continue with the current treatment and monitor for any changes.  - does follow up with gyn    2. Gastroesophageal Reflux Disease (GERD).  - Experienced increased acid reflux symptoms after raising the dose of Cardizem in December.  - Previously on pantoprazole, which helped somewhat but did not completely alleviate symptoms.  - Will start esomeprazole tomorrow and advised to take it 30 minutes before breakfast, separate from other medications.  - EGD is scheduled for later today.    3. Atrial Fibrillation.  - Intermittent episodes of atrial fibrillation, with the longest episode lasting 45 minutes.  - Pulse rate today is 65, and rhythm is regular.  - Currently on metoprolol and Eliquis, providing protection against stroke.  - Advised to monitor for any increase in frequency or duration of episodes and report any significant changes.  -follows with cardio    4. Anxiety.  - Reports increased anxiety, particularly in situations that did not previously cause stress.  - Prescription for Xanax provided to manage anxiety as needed, advised not to use routinely.  - Prescription will be sent to [facility].  - Must be seen every 6 months to continue prescribing Xanax as it is a controlled substance.    5. Blood pressure management.  - Blood pressure readings are within normal limits today at 126/62.  - Currently taking hydrochlorothiazide and metoprolol 25 mg.  - No current use of

## 2025-05-02 ENCOUNTER — OFFICE VISIT (OUTPATIENT)
Dept: CARDIOLOGY CLINIC | Age: 75
End: 2025-05-02
Payer: MEDICARE

## 2025-05-02 VITALS
SYSTOLIC BLOOD PRESSURE: 128 MMHG | DIASTOLIC BLOOD PRESSURE: 79 MMHG | BODY MASS INDEX: 33.09 KG/M2 | HEIGHT: 66 IN | HEART RATE: 78 BPM

## 2025-05-02 DIAGNOSIS — E78.01 FAMILIAL HYPERCHOLESTEROLEMIA: ICD-10-CM

## 2025-05-02 DIAGNOSIS — I48.0 PAROXYSMAL ATRIAL FIBRILLATION (HCC): ICD-10-CM

## 2025-05-02 DIAGNOSIS — I10 PRIMARY HYPERTENSION: Primary | ICD-10-CM

## 2025-05-02 PROCEDURE — G8399 PT W/DXA RESULTS DOCUMENT: HCPCS | Performed by: NUCLEAR MEDICINE

## 2025-05-02 PROCEDURE — G8417 CALC BMI ABV UP PARAM F/U: HCPCS | Performed by: NUCLEAR MEDICINE

## 2025-05-02 PROCEDURE — 3078F DIAST BP <80 MM HG: CPT | Performed by: NUCLEAR MEDICINE

## 2025-05-02 PROCEDURE — G8427 DOCREV CUR MEDS BY ELIG CLIN: HCPCS | Performed by: NUCLEAR MEDICINE

## 2025-05-02 PROCEDURE — 3074F SYST BP LT 130 MM HG: CPT | Performed by: NUCLEAR MEDICINE

## 2025-05-02 PROCEDURE — 1123F ACP DISCUSS/DSCN MKR DOCD: CPT | Performed by: NUCLEAR MEDICINE

## 2025-05-02 PROCEDURE — 99214 OFFICE O/P EST MOD 30 MIN: CPT | Performed by: NUCLEAR MEDICINE

## 2025-05-02 PROCEDURE — 1036F TOBACCO NON-USER: CPT | Performed by: NUCLEAR MEDICINE

## 2025-05-02 PROCEDURE — 3017F COLORECTAL CA SCREEN DOC REV: CPT | Performed by: NUCLEAR MEDICINE

## 2025-05-02 PROCEDURE — 1159F MED LIST DOCD IN RCRD: CPT | Performed by: NUCLEAR MEDICINE

## 2025-05-02 PROCEDURE — 1090F PRES/ABSN URINE INCON ASSESS: CPT | Performed by: NUCLEAR MEDICINE

## 2025-05-02 NOTE — PROGRESS NOTES
[facility] [place] [facility]  [facility] CARDIOLOGY  [address]  [address]  [place] [address]  Dept: [phone]  Dept Fax: [phone]  Loc: [phone]    Visit Date: 2025    Malia Garrido is a 74 y.o. female who presents todayfor:  Chief Complaint   Patient presents with    1 Year Follow Up    Atrial Fibrillation    Palpitations    Hypertension    Hyperlipidemia   Tried off of metoprolol  Had issues with cardizem   Back on metoprolol  Know PAF  Event monitor was okay   No chest pain   Still feels palpitation   Still with intermittent A fib   No known CAD  No changes in breathing  BP is stable  No syncope  On statins for hyperlipidemia  No issues with the meds         HPI:  HPI  Past Medical History:   Diagnosis Date    Aneurysm     Brother has heart aneurysym    Anticoagulant long-term use     no longer take    Atrial fibrillation (HCC)     questionable    Bile acid esophageal reflux     Chronic back pain     GERD (gastroesophageal reflux disease) 25    Burning, belching, nausea. Seeing Dr Henderson    Hypertension     Borderline at times    Hypothyroidism     Insulin resistance     Obesity     Osteoarthritis     Pancreatitis     r/t Gall bladder    Type 2 diabetes mellitus without complication (HCC)       Past Surgical History:   Procedure Laterality Date    APPENDECTOMY      BREAST BIOPSY Left     benign     SECTION      CHOLECYSTECTOMY      EYE SURGERY      retinal repair    HYSTERECTOMY (CERVIX STATUS UNKNOWN)      HYSTERECTOMY, TOTAL ABDOMINAL (CERVIX REMOVED)      OVARY REMOVAL Bilateral     CA BX OF BREAST, NEEDLE CORE, IMAGE GUIDE Left     benign    TONSILLECTOMY      UPPER GASTROINTESTINAL ENDOSCOPY       Family History   Problem Relation Age of Onset    Diabetes Mother     Heart Disease Mother     Cancer Mother     Breast Cancer Mother 70    Heart Disease Father     Cancer Father         bone    High Blood Pressure Father     High

## 2025-05-08 ENCOUNTER — LAB (OUTPATIENT)
Dept: LAB | Age: 75
End: 2025-05-08

## 2025-05-08 ENCOUNTER — PATIENT MESSAGE (OUTPATIENT)
Dept: FAMILY MEDICINE CLINIC | Age: 75
End: 2025-05-08

## 2025-05-08 DIAGNOSIS — R74.8 ELEVATED LIVER ENZYMES: Primary | ICD-10-CM

## 2025-05-08 DIAGNOSIS — R74.8 ELEVATED LIVER ENZYMES: ICD-10-CM

## 2025-05-08 LAB
ALBUMIN SERPL BCG-MCNC: 3.8 G/DL (ref 3.4–4.9)
ALP SERPL-CCNC: 91 U/L (ref 38–126)
ALT SERPL W/O P-5'-P-CCNC: 36 U/L (ref 10–35)
AST SERPL-CCNC: 30 U/L (ref 10–35)
BILIRUB CONJ SERPL-MCNC: 0.3 MG/DL (ref 0–0.2)
BILIRUB SERPL-MCNC: 0.7 MG/DL (ref 0.3–1.2)
PROT SERPL-MCNC: 6.8 G/DL (ref 6.4–8.3)

## 2025-05-09 ENCOUNTER — RESULTS FOLLOW-UP (OUTPATIENT)
Dept: FAMILY MEDICINE CLINIC | Age: 75
End: 2025-05-09

## 2025-05-21 RX ORDER — HYDROCHLOROTHIAZIDE 25 MG/1
25 TABLET ORAL DAILY
Qty: 90 TABLET | Refills: 3 | Status: SHIPPED | OUTPATIENT
Start: 2025-05-21

## 2025-06-23 DIAGNOSIS — N76.1 CHRONIC VAGINITIS: ICD-10-CM

## 2025-06-23 DIAGNOSIS — I49.1 PAC (PREMATURE ATRIAL CONTRACTION): ICD-10-CM

## 2025-06-23 DIAGNOSIS — I48.0 PAROXYSMAL ATRIAL FIBRILLATION (HCC): ICD-10-CM

## 2025-06-23 RX ORDER — ESTRADIOL 0.1 MG/G
1 CREAM VAGINAL SEE ADMIN INSTRUCTIONS
Qty: 42.5 G | Refills: 1 | Status: SHIPPED | OUTPATIENT
Start: 2025-06-23

## 2025-06-23 RX ORDER — METOPROLOL SUCCINATE 25 MG/1
25 TABLET, EXTENDED RELEASE ORAL 2 TIMES DAILY
Qty: 180 TABLET | Refills: 3 | Status: SHIPPED | OUTPATIENT
Start: 2025-06-23

## 2025-06-23 RX ORDER — APIXABAN 5 MG/1
5 TABLET, FILM COATED ORAL 2 TIMES DAILY
Qty: 180 TABLET | Refills: 3 | Status: SHIPPED | OUTPATIENT
Start: 2025-06-23

## 2025-06-23 NOTE — TELEPHONE ENCOUNTER
This medication refill is regarding a fax request.  Refill requested by the pharmacy.    Requested Prescriptions     Pending Prescriptions Disp Refills    estradiol (ESTRACE) 0.1 MG/GM vaginal cream 42.5 g 1     Sig: Place 1 g vaginally See Admin Instructions APPLY 1 GRAM VAGINALLY FOR 2 WEEKS, THEN FOR MAINTENANCE       Date of last visit: 4/16/2025  Date of next visit: 10/8/2025  Date of last refill: 11/19/2024  Pharmacy Name: [facility]

## 2025-06-30 ENCOUNTER — PATIENT MESSAGE (OUTPATIENT)
Dept: FAMILY MEDICINE CLINIC | Age: 75
End: 2025-06-30

## 2025-06-30 DIAGNOSIS — N76.1 CHRONIC VAGINITIS: ICD-10-CM

## 2025-07-01 RX ORDER — ESTRADIOL 0.1 MG/G
1 CREAM VAGINAL EVERY OTHER DAY
Qty: 42 G | Refills: 1 | Status: SHIPPED | OUTPATIENT
Start: 2025-07-01

## 2025-08-31 DIAGNOSIS — E03.9 ACQUIRED HYPOTHYROIDISM: ICD-10-CM

## 2025-09-02 ENCOUNTER — HOSPITAL ENCOUNTER (OUTPATIENT)
Dept: MRI IMAGING | Age: 75
Discharge: HOME | End: 2025-09-02
Payer: MEDICARE

## 2025-09-02 DIAGNOSIS — M54.59 WEIGHT LIFTER'S BACK: ICD-10-CM

## 2025-09-02 PROCEDURE — 72148 MRI LUMBAR SPINE W/O DYE: CPT

## 2025-09-02 RX ORDER — THYROID 60 MG/1
TABLET ORAL
Qty: 78 TABLET | Refills: 3 | Status: SHIPPED | OUTPATIENT
Start: 2025-09-02

## 2025-09-03 ENCOUNTER — HOSPITAL ENCOUNTER (OUTPATIENT)
Dept: MAMMOGRAPHY | Age: 75
Discharge: HOME | End: 2025-09-03
Payer: MEDICARE

## 2025-09-03 DIAGNOSIS — Z12.39 BREAST SCREENING: ICD-10-CM

## 2025-09-03 PROCEDURE — 77063 BREAST TOMOSYNTHESIS BI: CPT
